# Patient Record
Sex: FEMALE | Race: ASIAN | NOT HISPANIC OR LATINO | Employment: STUDENT | URBAN - METROPOLITAN AREA
[De-identification: names, ages, dates, MRNs, and addresses within clinical notes are randomized per-mention and may not be internally consistent; named-entity substitution may affect disease eponyms.]

---
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
## 2019-02-07 DIAGNOSIS — Z30.41 ENCOUNTER FOR BIRTH CONTROL PILLS MAINTENANCE: ICD-10-CM

## 2019-02-07 RX ORDER — NORGESTIMATE AND ETHINYL ESTRADIOL 7DAYSX3 28
KIT ORAL
Qty: 84 TABLET | Refills: 0 | Status: SHIPPED | OUTPATIENT
Start: 2019-02-07 | End: 2019-02-07 | Stop reason: SDUPTHER

## 2019-02-07 RX ORDER — NORGESTIMATE AND ETHINYL ESTRADIOL 7DAYSX3 28
1 KIT ORAL DAILY
Qty: 84 TABLET | Refills: 0 | Status: SHIPPED | OUTPATIENT
Start: 2019-02-07 | End: 2019-02-11 | Stop reason: SDUPTHER

## 2019-02-11 DIAGNOSIS — Z30.41 ENCOUNTER FOR BIRTH CONTROL PILLS MAINTENANCE: ICD-10-CM

## 2019-02-11 RX ORDER — NORGESTIMATE AND ETHINYL ESTRADIOL 7DAYSX3 28
KIT ORAL
Qty: 84 TABLET | Refills: 0 | Status: SHIPPED | OUTPATIENT
Start: 2019-02-11 | End: 2019-08-05 | Stop reason: SDUPTHER

## 2019-03-06 ENCOUNTER — OFFICE VISIT (OUTPATIENT)
Dept: OBGYN CLINIC | Facility: CLINIC | Age: 19
End: 2019-03-06
Payer: COMMERCIAL

## 2019-03-06 VITALS
WEIGHT: 119 LBS | DIASTOLIC BLOOD PRESSURE: 60 MMHG | HEIGHT: 67 IN | BODY MASS INDEX: 18.68 KG/M2 | SYSTOLIC BLOOD PRESSURE: 98 MMHG

## 2019-03-06 DIAGNOSIS — N60.12 FIBROCYSTIC BREAST CHANGES, LEFT: ICD-10-CM

## 2019-03-06 DIAGNOSIS — N63.10 BREAST MASS, RIGHT: Primary | ICD-10-CM

## 2019-03-06 DIAGNOSIS — N83.209 OVARIAN CYST RUPTURE: ICD-10-CM

## 2019-03-06 PROCEDURE — 99214 OFFICE O/P EST MOD 30 MIN: CPT | Performed by: OBSTETRICS & GYNECOLOGY

## 2019-03-06 NOTE — PATIENT INSTRUCTIONS
Fibrocystic Breast Changes   WHAT YOU NEED TO KNOW:   Fibrocystic changes are changes in your breast tissue  Your breast tissue may have small cysts, benign lumps (not cancer), or thickened areas  These breast tissue changes are common in women who have not gone through menopause  Fibrocystic breast changes do not increase your risk of breast cancer  The cause of fibrocystic breast changes is unknown  They may be related to hormonal changes that occur during your menstrual cycle  DISCHARGE INSTRUCTIONS:   Contact your healthcare provider if:   · You notice other changes in your breasts or nipples, such as dimpling or a rash  · You have bloody nipple discharge  · You have a fever  · You have questions or concerns about your condition or care  Medicines: You may need any of the following:  · NSAIDs , such as ibuprofen, help decrease swelling, pain, and fever  This medicine is available with or without a doctor's order  NSAIDs can cause stomach bleeding or kidney problems in certain people  If you take blood thinner medicine, always ask your healthcare provider if NSAIDs are safe for you  Always read the medicine label and follow directions  · Oral contraceptives  (birth control pills) may be recommended by your healthcare provider  These may help to decrease the level of hormones that worsen your signs and symptoms  · Take your medicine as directed  Contact your healthcare provider if you think your medicine is not helping or if you have side effects  Tell him of her if you are allergic to any medicine  Keep a list of the medicines, vitamins, and herbs you take  Include the amounts, and when and why you take them  Bring the list or the pill bottles to follow-up visits  Carry your medicine list with you in case of an emergency  Manage your symptoms:   · Apply heat  on your breasts for 20 to 30 minutes every 2 hours for as many days as directed   Heat helps decrease pain and muscle spasms  · Apply ice  on your breasts for 15 to 20 minutes every hour or as directed  Use an ice pack, or put crushed ice in a plastic bag  Cover it with a towel  Ice helps prevent tissue damage and decreases swelling and pain  · Wear a well-fitted, supportive bra  It may help to relieve pain and swelling  · Avoid or limit caffeine  This may help to decrease symptoms in some women  Caffeine is found in coffee, tea, sodas, and chocolate  Continue breast self-exams:  Check your breast for changes in size, shape, or feel of the breast tissue  Check under your arms and all around your breasts  If you have monthly periods, examine your breasts after your period is over  Contact your healthcare provider if you notice any changes in your breasts  If you have questions, ask for more information about how to do a breast self-exam         Follow up with your healthcare provider as directed:  Write down your questions so you remember to ask them during your visits  © 2017 [facility] [facility] Information is for End User's use only and may not be sold, redistributed or otherwise used for commercial purposes  All illustrations and images included in CareNotes® are the copyrighted property of [employer] , [employer]  or Leonid Lynch  The above information is an  only  It is not intended as medical advice for individual conditions or treatments  Talk to your doctor, nurse or pharmacist before following any medical regimen to see if it is safe and effective for you

## 2019-03-06 NOTE — PROGRESS NOTES
Assessment/Plan:     Diagnoses and all orders for this visit:    Breast mass, right  -     US breast right limited (diagnostic); Future    Fibrocystic breast changes, left  -     US breast left limited (diagnostic); Future    Ovarian cyst rupture  -     US pelvis transabdominal only; Future        Patient will be returning to college on   We ordered a right sided breast ultrasound and a left-sided breast ultrasound  We will call patient with results  Possible fibrocystic breast changes  Patient given information re: fibrocystic breast disease  Patient counseled on findings, diagnosis and management including use of NSAIDs, avoidance of caffeine  We will also order a pelvic ultrasound transabdominally to rule out any ovarian cysts or masses  We will call patient with results again  Subjective   Patient ID: Jose Sender is a 20 y o  female  Patient is here for problem visit  Patient was seen at [facility] clinic due to complaints of a right lump on her breast   Patient states that she noted axillary swelling that has now disappeared  On exam, there was a 1 cm mobile soft finding on the right tail  She did not have an ultrasound performed at that time  Patient declines any nipple discharge  Patient also with complaints of possible ruptured ovarian cyst where she was taken to the emergency room after passing out  She declines any pelvic pain  She is currently not sexually active and has never been sexually active  OB History        0    Para   0    Term   0       0    AB   0    Living   0       SAB   0    TAB   0    Ectopic   0    Multiple   0    Live Births   0                   Review of Systems   Constitutional: Negative  HENT: Negative  Eyes: Negative  Respiratory: Negative  Cardiovascular: Negative  Gastrointestinal: Negative  Endocrine: Negative  Genitourinary:        As noted in HPI   Musculoskeletal: Negative  Skin: Negative  Allergic/Immunologic: Negative  Neurological: Negative  Hematological: Negative  Psychiatric/Behavioral: Negative  History reviewed  No pertinent past medical history      Past Surgical History:   Procedure Laterality Date    EYE SURGERY      MOUTH SURGERY         Social History     Socioeconomic History    Marital status: Single     Spouse name: Not on file    Number of children: Not on file    Years of education: Not on file    Highest education level: Not on file   Occupational History    Not on file   Social Needs    Financial resource strain: Not on file    Food insecurity:     Worry: Not on file     Inability: Not on file    Transportation needs:     Medical: Not on file     Non-medical: Not on file   Tobacco Use    Smoking status: Never Smoker    Smokeless tobacco: Never Used   Substance and Sexual Activity    Alcohol use: Yes     Comment: occ    Drug use: No    Sexual activity: Never     Comment: uses birth control   Lifestyle    Physical activity:     Days per week: Not on file     Minutes per session: Not on file    Stress: Not on file   Relationships    Social connections:     Talks on phone: Not on file     Gets together: Not on file     Attends Church service: Not on file     Active member of club or organization: Not on file     Attends meetings of clubs or organizations: Not on file     Relationship status: Not on file    Intimate partner violence:     Fear of current or ex partner: Not on file     Emotionally abused: Not on file     Physically abused: Not on file     Forced sexual activity: Not on file   Other Topics Concern    Not on file   Social History Narrative    No caffeine use       Allergies   Allergen Reactions    Amoxicillin Hives    Cefprozil Hives    Ceftibuten     Diphenhydramine Hives    Penicillins          Current Outpatient Medications:     TRI-SPRINTEC 0 18/0 215/0 25 MG-35 MCG per tablet, TAKE ONE TABLET BY MOUTH DAILY, Disp: 84 tablet, Rfl: 0      Vitals:    19 0915   BP: 98/60       Body mass index is 18 64 kg/m²  Weight (last 2 days)     Date/Time   Weight    19 0915   54 (119)              Physical Exam   Constitutional: She is oriented to person, place, and time  Vital signs are normal  She appears well-developed and well-nourished  No distress  Pulmonary/Chest: She exhibits no mass and no deformity  Right breast exhibits mass  Right breast exhibits no inverted nipple, no nipple discharge and no tenderness  Left breast exhibits inverted nipple  Left breast exhibits no mass, no nipple discharge and no tenderness  Fibrocystic left breast    Right breast shows indistinct possible mass on right upper quadrant       Abdominal: Soft  She exhibits no distension  There is no tenderness  There is no guarding  Neurological: She is alert and oriented to person, place, and time  Skin: Skin is warm and dry  Psychiatric: She has a normal mood and affect  Her behavior is normal          Menstrual History:  OB History        0    Para   0    Term   0       0    AB   0    Living   0       SAB   0    TAB   0    Ectopic   0    Multiple   0    Live Births   0                  Patient's last menstrual period was 2019 (exact date)  The following portions of the patient's history were reviewed and updated as appropriate: allergies, current medications, past family history, past medical history, past social history, past surgical history and problem list       Counseling / Coordination of Care  Total time spent today30 minutes  minutes  Greater than 50% of total time was spent with the patient and / or family counseling and / or coordination of care

## 2019-03-08 ENCOUNTER — TELEPHONE (OUTPATIENT)
Dept: OBGYN CLINIC | Facility: CLINIC | Age: 19
End: 2019-03-08

## 2019-03-08 ENCOUNTER — ULTRASOUND (OUTPATIENT)
Dept: OBGYN CLINIC | Facility: CLINIC | Age: 19
End: 2019-03-08
Payer: COMMERCIAL

## 2019-03-08 ENCOUNTER — HOSPITAL ENCOUNTER (OUTPATIENT)
Dept: ULTRASOUND IMAGING | Facility: CLINIC | Age: 19
Discharge: HOME | End: 2019-03-08
Payer: COMMERCIAL

## 2019-03-08 VITALS — HEIGHT: 67 IN | WEIGHT: 119 LBS | BODY MASS INDEX: 18.68 KG/M2

## 2019-03-08 DIAGNOSIS — R10.31 RLQ ABDOMINAL PAIN: Primary | ICD-10-CM

## 2019-03-08 DIAGNOSIS — N63.10 BREAST MASS, RIGHT: ICD-10-CM

## 2019-03-08 DIAGNOSIS — N60.12 FIBROCYSTIC BREAST CHANGES, LEFT: ICD-10-CM

## 2019-03-08 PROCEDURE — 76642 ULTRASOUND BREAST LIMITED: CPT

## 2019-03-08 PROCEDURE — 76856 US EXAM PELVIC COMPLETE: CPT | Performed by: OBSTETRICS & GYNECOLOGY

## 2019-03-08 NOTE — PROGRESS NOTES
AMB US Pelvic Non OB  Date/Time: 3/8/2019 11:08 AM  Performed by: Caterina Wasserman  Authorized by: Sam Mercedes MD     Procedure details:     Indications: non-obstetric abdominal pain      Technique:  US Pelvic, Non-OB with complete exam    Position: supine exam    Uterine findings:     Diameter (mm):  29    Length (mm):  75    Width (mm):  42    Endometrial stripe: identified      Endometrium thickness (mm):  4 9  Left ovary findings:     Left ovary:  Visualized    Diameter (mm):  18 9    Length (mm):  28 8    Width (mm):  18 9  Right ovary findings:     Right ovary:  Visualized    Diameter (mm):  20 1    Length (mm):  25 9    Width (mm):  17 8  Other findings:     Free pelvic fluid: not identified    Post-Procedure Details:     Impression:  Serial transabdominal images were obtained through the distended urinary bladder  Anteverted uterus and bilateral ovaries appear within normal limits  No free fluid  Tolerance: Tolerated well, no immediate complications    Complications: no complications    Additional Procedure Comments:      Transabdominal ultrasound only due to patient never being sexually active

## 2019-03-08 NOTE — TELEPHONE ENCOUNTER
Spoke with pt re: breast US findings as no masses  Advised repeat clinical breast exam in 3 months and continued breast awareness  Also called her to let her know her pelvic US is normal and did not show ovarian cysts

## 2019-05-14 ENCOUNTER — OFFICE VISIT (OUTPATIENT)
Dept: FAMILY MEDICINE CLINIC | Facility: CLINIC | Age: 19
End: 2019-05-14
Payer: COMMERCIAL

## 2019-05-14 ENCOUNTER — TELEPHONE (OUTPATIENT)
Dept: FAMILY MEDICINE CLINIC | Facility: CLINIC | Age: 19
End: 2019-05-14

## 2019-05-14 VITALS — BODY MASS INDEX: 18.52 KG/M2 | HEIGHT: 67 IN | WEIGHT: 118 LBS

## 2019-05-14 DIAGNOSIS — H69.81 DYSFUNCTION OF RIGHT EUSTACHIAN TUBE: ICD-10-CM

## 2019-05-14 DIAGNOSIS — Z76.89 ENCOUNTER TO ESTABLISH CARE WITH NEW DOCTOR: ICD-10-CM

## 2019-05-14 DIAGNOSIS — H69.81 EUSTACHIAN TUBE DYSFUNCTION, RIGHT: ICD-10-CM

## 2019-05-14 DIAGNOSIS — Z13.21 ENCOUNTER FOR VITAMIN DEFICIENCY SCREENING: ICD-10-CM

## 2019-05-14 DIAGNOSIS — Z13.29 SCREENING FOR THYROID DISORDER: ICD-10-CM

## 2019-05-14 DIAGNOSIS — Z00.00 ANNUAL PHYSICAL EXAM: ICD-10-CM

## 2019-05-14 DIAGNOSIS — H92.01 RIGHT EAR PAIN: ICD-10-CM

## 2019-05-14 DIAGNOSIS — Z13.220 SCREENING FOR LIPID DISORDERS: ICD-10-CM

## 2019-05-14 DIAGNOSIS — Z13.89 SCREENING FOR GENITOURINARY CONDITION: ICD-10-CM

## 2019-05-14 DIAGNOSIS — Z13.1 SCREENING FOR DIABETES MELLITUS: Primary | ICD-10-CM

## 2019-05-14 PROCEDURE — 99385 PREV VISIT NEW AGE 18-39: CPT | Performed by: INTERNAL MEDICINE

## 2019-08-01 ENCOUNTER — APPOINTMENT (OUTPATIENT)
Dept: LAB | Facility: HOSPITAL | Age: 19
End: 2019-08-01
Payer: COMMERCIAL

## 2019-08-01 ENCOUNTER — OFFICE VISIT (OUTPATIENT)
Dept: FAMILY MEDICINE CLINIC | Facility: CLINIC | Age: 19
End: 2019-08-01
Payer: COMMERCIAL

## 2019-08-01 VITALS
DIASTOLIC BLOOD PRESSURE: 70 MMHG | HEART RATE: 73 BPM | HEIGHT: 67 IN | BODY MASS INDEX: 19.15 KG/M2 | OXYGEN SATURATION: 98 % | SYSTOLIC BLOOD PRESSURE: 98 MMHG | WEIGHT: 122 LBS

## 2019-08-01 DIAGNOSIS — Z00.00 ANNUAL PHYSICAL EXAM: ICD-10-CM

## 2019-08-01 DIAGNOSIS — Z82.79 FAMILY HISTORY OF BICUSPID AORTIC VALVE: Primary | ICD-10-CM

## 2019-08-01 LAB
25(OH)D3 SERPL-MCNC: 20.7 NG/ML (ref 30–100)
ALBUMIN SERPL BCP-MCNC: 4.2 G/DL (ref 3–5.2)
ALP SERPL-CCNC: 50 U/L (ref 43–122)
ALT SERPL W P-5'-P-CCNC: 14 U/L (ref 9–52)
ANION GAP SERPL CALCULATED.3IONS-SCNC: 7 MMOL/L (ref 5–14)
AST SERPL W P-5'-P-CCNC: 28 U/L (ref 14–36)
BACTERIA UR QL AUTO: ABNORMAL /HPF
BASOPHILS # BLD AUTO: 0.1 THOUSANDS/ΜL (ref 0–0.1)
BASOPHILS NFR BLD AUTO: 1 % (ref 0–1)
BILIRUB SERPL-MCNC: 0.4 MG/DL
BILIRUB UR QL STRIP: NEGATIVE
BUN SERPL-MCNC: 8 MG/DL (ref 5–25)
CALCIUM SERPL-MCNC: 9.5 MG/DL (ref 8.4–10.2)
CHLORIDE SERPL-SCNC: 104 MMOL/L (ref 97–108)
CHOLEST SERPL-MCNC: 203 MG/DL
CLARITY UR: ABNORMAL
CO2 SERPL-SCNC: 28 MMOL/L (ref 22–30)
COLOR UR: YELLOW
CREAT SERPL-MCNC: 0.56 MG/DL (ref 0.6–1.2)
EOSINOPHIL # BLD AUTO: 0.1 THOUSAND/ΜL (ref 0–0.4)
EOSINOPHIL NFR BLD AUTO: 1 % (ref 0–6)
ERYTHROCYTE [DISTWIDTH] IN BLOOD BY AUTOMATED COUNT: 14.4 %
GFR SERPL CREATININE-BSD FRML MDRD: 135 ML/MIN/1.73SQ M
GLUCOSE P FAST SERPL-MCNC: 81 MG/DL (ref 70–99)
GLUCOSE UR STRIP-MCNC: NEGATIVE MG/DL
HCT VFR BLD AUTO: 38.1 % (ref 36–46)
HDLC SERPL-MCNC: 73 MG/DL (ref 40–59)
HGB BLD-MCNC: 12.3 G/DL (ref 12–16)
HGB UR QL STRIP.AUTO: NEGATIVE
KETONES UR STRIP-MCNC: NEGATIVE MG/DL
LDLC SERPL CALC-MCNC: 106 MG/DL
LEUKOCYTE ESTERASE UR QL STRIP: 25
LYMPHOCYTES # BLD AUTO: 1.9 THOUSANDS/ΜL (ref 0.5–4)
LYMPHOCYTES NFR BLD AUTO: 20 % (ref 25–45)
MCH RBC QN AUTO: 28.1 PG (ref 26–34)
MCHC RBC AUTO-ENTMCNC: 32.2 G/DL (ref 31–36)
MCV RBC AUTO: 87 FL (ref 80–100)
MONOCYTES # BLD AUTO: 0.6 THOUSAND/ΜL (ref 0.2–0.9)
MONOCYTES NFR BLD AUTO: 6 % (ref 1–10)
NEUTROPHILS # BLD AUTO: 6.7 THOUSANDS/ΜL (ref 1.8–7.8)
NEUTS SEG NFR BLD AUTO: 72 % (ref 45–65)
NITRITE UR QL STRIP: NEGATIVE
NON-SQ EPI CELLS URNS QL MICRO: ABNORMAL /HPF
NONHDLC SERPL-MCNC: 130 MG/DL
PH UR STRIP.AUTO: 7 [PH]
PLATELET # BLD AUTO: 318 THOUSANDS/UL (ref 150–450)
PMV BLD AUTO: 9.7 FL (ref 8.9–12.7)
POTASSIUM SERPL-SCNC: 4.2 MMOL/L (ref 3.6–5)
PROT SERPL-MCNC: 7.6 G/DL (ref 5.9–8.4)
PROT UR STRIP-MCNC: NEGATIVE MG/DL
RBC # BLD AUTO: 4.36 MILLION/UL (ref 4–5.2)
RBC #/AREA URNS AUTO: ABNORMAL /HPF
SODIUM SERPL-SCNC: 139 MMOL/L (ref 137–147)
SP GR UR STRIP.AUTO: 1.01 (ref 1–1.04)
TRIGL SERPL-MCNC: 119 MG/DL
TSH SERPL DL<=0.05 MIU/L-ACNC: 1.04 UIU/ML (ref 0.47–4.68)
UROBILINOGEN UA: NEGATIVE MG/DL
WBC # BLD AUTO: 9.3 THOUSAND/UL (ref 4.5–11)
WBC #/AREA URNS AUTO: ABNORMAL /HPF

## 2019-08-01 PROCEDURE — 81001 URINALYSIS AUTO W/SCOPE: CPT | Performed by: INTERNAL MEDICINE

## 2019-08-01 PROCEDURE — 99395 PREV VISIT EST AGE 18-39: CPT | Performed by: INTERNAL MEDICINE

## 2019-08-01 PROCEDURE — 80061 LIPID PANEL: CPT | Performed by: INTERNAL MEDICINE

## 2019-08-01 PROCEDURE — 36415 COLL VENOUS BLD VENIPUNCTURE: CPT | Performed by: INTERNAL MEDICINE

## 2019-08-01 PROCEDURE — 85025 COMPLETE CBC W/AUTO DIFF WBC: CPT | Performed by: INTERNAL MEDICINE

## 2019-08-01 PROCEDURE — 82306 VITAMIN D 25 HYDROXY: CPT | Performed by: INTERNAL MEDICINE

## 2019-08-01 PROCEDURE — 80053 COMPREHEN METABOLIC PANEL: CPT | Performed by: INTERNAL MEDICINE

## 2019-08-01 PROCEDURE — 84443 ASSAY THYROID STIM HORMONE: CPT | Performed by: INTERNAL MEDICINE

## 2019-08-01 NOTE — PROGRESS NOTES
Assessment/Plan:         Diagnoses and all orders for this visit:  Annual physical exam  Patient will go for lab studies ordered last visit  She will also get echocardiogram as mentioned below  I will see her back in a year  Patient encouraged to get flu vaccine in October  Family history of bicuspid aortic valve  -     Echo complete with contrast if indicated; Future            Subjective:      Patient ID: Dustin Valencia is a 20 y o  female  HPI  Patient is here for annual physical   Had seen a couple of months ago with right ear discomfort reports that is completely resolved  Patient's father has bicuspid aortic valve  I will order screening echocardiogram   Patient returns to [address] [place] [address] in 3 weeks  The following portions of the patient's history were reviewed and updated as appropriate: allergies, current medications, past family history, past medical history, past social history, past surgical history and problem list       Review of Systems   Constitutional: Negative for appetite change, chills, fatigue, fever and unexpected weight change  HENT: Negative for congestion, hearing loss, postnasal drip, trouble swallowing and voice change  Eyes: Negative for pain and visual disturbance  Respiratory: Negative for cough, chest tightness and shortness of breath  Cardiovascular: Negative for chest pain, palpitations and leg swelling  Gastrointestinal: Negative for abdominal pain, blood in stool, constipation, diarrhea, nausea and vomiting  Endocrine: Negative for cold intolerance, heat intolerance, polydipsia and polyphagia  Genitourinary: Negative for difficulty urinating, flank pain, frequency and hematuria  Musculoskeletal: Negative for arthralgias, back pain, gait problem, joint swelling and myalgias  Skin: Negative for rash  Neurological: Negative for dizziness, weakness, light-headedness, numbness and headaches  Hematological: Negative for adenopathy  Does not bruise/bleed easily  Psychiatric/Behavioral: Negative for confusion, dysphoric mood and sleep disturbance  Objective:      BP 98/70 (BP Location: Left arm, Patient Position: Sitting, Cuff Size: Standard)   Pulse 73   Ht 5' 7" (1 702 m)   Wt 55 3 kg (122 lb)   SpO2 98%   BMI 19 11 kg/m²          Physical Exam   Constitutional: She is oriented to person, place, and time  She appears well-developed and well-nourished  No distress  HENT:   Head: Normocephalic  Mouth/Throat: No oropharyngeal exudate  Eyes: Pupils are equal, round, and reactive to light  No scleral icterus  Neck: No thyromegaly present  Cardiovascular: Normal rate, regular rhythm, normal heart sounds and intact distal pulses  No murmur heard  Pulmonary/Chest: Effort normal and breath sounds normal  No respiratory distress  She has no wheezes  She has no rales  Abdominal: Soft  Bowel sounds are normal  There is no tenderness  There is no rebound  Musculoskeletal: She exhibits no edema  Lymphadenopathy:     She has no cervical adenopathy  Neurological: She is alert and oriented to person, place, and time  She has normal reflexes  No cranial nerve deficit  Skin: Skin is warm  Psychiatric: She has a normal mood and affect   Her behavior is normal  Judgment and thought content normal

## 2019-08-05 DIAGNOSIS — Z30.41 ENCOUNTER FOR BIRTH CONTROL PILLS MAINTENANCE: ICD-10-CM

## 2019-08-05 RX ORDER — NORGESTIMATE AND ETHINYL ESTRADIOL 7DAYSX3 28
KIT ORAL
Qty: 84 TABLET | Refills: 0 | Status: SHIPPED | OUTPATIENT
Start: 2019-08-05 | End: 2019-08-06 | Stop reason: SDUPTHER

## 2019-08-06 ENCOUNTER — ANNUAL EXAM (OUTPATIENT)
Dept: OBGYN CLINIC | Facility: CLINIC | Age: 19
End: 2019-08-06
Payer: COMMERCIAL

## 2019-08-06 VITALS
BODY MASS INDEX: 18.93 KG/M2 | WEIGHT: 120.6 LBS | DIASTOLIC BLOOD PRESSURE: 64 MMHG | HEIGHT: 67 IN | SYSTOLIC BLOOD PRESSURE: 108 MMHG

## 2019-08-06 DIAGNOSIS — Z23 NEED FOR HPV VACCINATION: ICD-10-CM

## 2019-08-06 DIAGNOSIS — Z71.85 HPV VACCINE COUNSELING: ICD-10-CM

## 2019-08-06 DIAGNOSIS — Z01.419 ENCOUNTER FOR GYNECOLOGICAL EXAMINATION (GENERAL) (ROUTINE) WITHOUT ABNORMAL FINDINGS: Primary | ICD-10-CM

## 2019-08-06 DIAGNOSIS — Z11.51 ENCOUNTER FOR SCREENING FOR HUMAN PAPILLOMAVIRUS (HPV): ICD-10-CM

## 2019-08-06 DIAGNOSIS — Z30.41 ENCOUNTER FOR BIRTH CONTROL PILLS MAINTENANCE: ICD-10-CM

## 2019-08-06 PROCEDURE — 90651 9VHPV VACCINE 2/3 DOSE IM: CPT

## 2019-08-06 PROCEDURE — 90471 IMMUNIZATION ADMIN: CPT

## 2019-08-06 PROCEDURE — 99395 PREV VISIT EST AGE 18-39: CPT | Performed by: OBSTETRICS & GYNECOLOGY

## 2019-08-06 RX ORDER — NORGESTIMATE AND ETHINYL ESTRADIOL 7DAYSX3 28
1 KIT ORAL DAILY
Qty: 84 TABLET | Refills: 3 | Status: SHIPPED | OUTPATIENT
Start: 2019-08-06 | End: 2020-09-21

## 2019-08-06 NOTE — PROGRESS NOTES
Assessment        Diagnoses and all orders for this visit:    Encounter for gynecological examination (general) (routine) without abnormal findings    Encounter for birth control pills maintenance  -     norgestimate-ethinyl estradiol (TRI-SPRINTEC) 0 18/0 215/0 25 MG-35 MCG per tablet; Take 1 tablet by mouth daily    Encounter for screening for human papillomavirus (HPV)    HPV vaccine counseling  -     HPV Vaccine 9 valent IM    Need for HPV vaccination  -     HPV Vaccine 9 valent IM                  Plan      All questions answered  Contraception: OCP (estrogen/progesterone)  Follow up in 2 months  Follow up as needed  Patient counseled on HPV vaccine  She received this today  Follow-up in 2 months for HPV vaccine #2    Syed Cordero is a 20 y o  female who presents for annual exam     Patient is here requesting OCP refill  She is not yet and has never been sexually active  Patient initially was seen by plastic surgery for possible labia plasty  This is on hold due to school scheduling conflicts  Patient has never had HPV vaccine and is interested in that BV vaccination  Current contraception: OCP (estrogen/progesterone)  History of abnormal Pap smear: no  History of abnormal mammogram: no  Family history of uterine or ovarian cancer: no  Family history of breast cancer: no    Menstrual History:  OB History        0    Para   0    Term   0       0    AB   0    Living   0       SAB   0    TAB   0    Ectopic   0    Multiple   0    Live Births   0                  Patient's last menstrual period was 2019 (exact date)           Past Medical History:   Diagnosis Date    Fibrocystic breast      Past Surgical History:   Procedure Laterality Date    EYE SURGERY      MOUTH SURGERY       Social History     Socioeconomic History    Marital status: Single     Spouse name: Not on file    Number of children: Not on file    Years of education: Not on file    Highest education level: Not on file   Occupational History    Not on file   Social Needs    Financial resource strain: Not on file    Food insecurity:     Worry: Not on file     Inability: Not on file    Transportation needs:     Medical: Not on file     Non-medical: Not on file   Tobacco Use    Smoking status: Never Smoker    Smokeless tobacco: Never Used   Substance and Sexual Activity    Alcohol use: Yes     Comment: occ    Drug use: No    Sexual activity: Never     Comment: uses birth control   Lifestyle    Physical activity:     Days per week: Not on file     Minutes per session: Not on file    Stress: Not on file   Relationships    Social connections:     Talks on phone: Not on file     Gets together: Not on file     Attends Anabaptist service: Not on file     Active member of club or organization: Not on file     Attends meetings of clubs or organizations: Not on file     Relationship status: Not on file    Intimate partner violence:     Fear of current or ex partner: Not on file     Emotionally abused: Not on file     Physically abused: Not on file     Forced sexual activity: Not on file   Other Topics Concern    Not on file   Social History Narrative    No caffeine use         Current Outpatient Medications:     norgestimate-ethinyl estradiol (TRI-SPRINTEC) 0 18/0 215/0 25 MG-35 MCG per tablet, Take 1 tablet by mouth daily, Disp: 84 tablet, Rfl: 3    Allergies   Allergen Reactions    Amoxicillin Hives    Cefprozil Hives    Ceftibuten     Diphenhydramine Hives    Penicillins            Review of Systems   Constitutional: Negative  HENT: Negative  Eyes: Negative  Respiratory: Negative  Cardiovascular: Negative  Gastrointestinal: Negative  Endocrine: Negative  Genitourinary:        As noted in HPI   Musculoskeletal: Negative  Skin: Negative  Allergic/Immunologic: Negative  Neurological: Negative  Hematological: Negative  Psychiatric/Behavioral: Negative  /64 (BP Location: Right arm, Patient Position: Sitting, Cuff Size: Standard)   Ht 5' 7" (1 702 m)   Wt 54 7 kg (120 lb 9 6 oz)   LMP 07/17/2019 (Exact Date)   BMI 18 89 kg/m²         Physical Exam   Constitutional: She is oriented to person, place, and time  She appears well-developed and well-nourished  Genitourinary: Pelvic exam was performed with patient supine  There is no rash or lesion on the right labia  There is no rash or lesion on the left labia  Genitourinary Comments: Deferred internal examination       HENT:   Head: Normocephalic  Neck: No thyromegaly present  Cardiovascular: Normal rate, regular rhythm and normal heart sounds  No murmur heard  Pulmonary/Chest: Effort normal and breath sounds normal  No respiratory distress  She has no wheezes  She has no rales  Right breast exhibits no mass, no nipple discharge, no skin change and no tenderness  Left breast exhibits no mass, no nipple discharge, no skin change and no tenderness  Abdominal: Soft  She exhibits no distension and no mass  There is no tenderness  There is no rebound and no guarding  Musculoskeletal: She exhibits no edema or tenderness  Lymphadenopathy:        Right: No inguinal adenopathy present  Left: No inguinal adenopathy present  Neurological: She is alert and oriented to person, place, and time  Skin: Skin is warm and dry  Psychiatric: She has a normal mood and affect

## 2019-08-06 NOTE — PATIENT INSTRUCTIONS
HPV (Human Papillomavirus) Vaccine for Adults   WHAT YOU NEED TO KNOW:   The HPV vaccine is an injection given to females and males to protect against human papillomavirus infection  HPV is most commonly spread through sexual activity  It can also be spread from a mother to her baby during delivery  The HPV vaccine is most effective if given before sexual activity begins  This allows your body to build almost complete protection against HPV before you have contact with the virus  The HPV vaccine is still effective through the age of 32 after sexual activity has begun  DISCHARGE INSTRUCTIONS:   Call 911 for any of the following:   · You have signs of a severe allergic reaction, such as trouble breathing, hives, or wheezing  Return to the emergency department if:   · You have a high fever or behavior changes that concern you  Contact your healthcare provider if:   · You have questions or concerns about the HPV vaccine  Apply a warm compress  to the area to relieve swelling and pain  Follow up with your healthcare provider as directed:  Write down your questions so you remember to ask them during your visits  © 2017 [address] is for End User's use only and may not be sold, redistributed or otherwise used for commercial purposes  All illustrations and images included in CareNotes® are the copyrighted property of A D A M [employer] Inc  or Leonid Lynch  The above information is an  only  It is not intended as medical advice for individual conditions or treatments  Talk to your doctor, nurse or pharmacist before following any medical regimen to see if it is safe and effective for you

## 2019-08-07 ENCOUNTER — HOSPITAL ENCOUNTER (OUTPATIENT)
Dept: NON INVASIVE DIAGNOSTICS | Facility: CLINIC | Age: 19
Discharge: HOME | End: 2019-08-07
Payer: COMMERCIAL

## 2019-08-07 DIAGNOSIS — Z82.79 FAMILY HISTORY OF BICUSPID AORTIC VALVE: ICD-10-CM

## 2019-08-07 PROCEDURE — 93306 TTE W/DOPPLER COMPLETE: CPT

## 2019-08-07 PROCEDURE — 93306 TTE W/DOPPLER COMPLETE: CPT | Performed by: INTERNAL MEDICINE

## 2019-11-24 ENCOUNTER — OFFICE VISIT (OUTPATIENT)
Dept: URGENT CARE | Facility: MEDICAL CENTER | Age: 19
End: 2019-11-24
Payer: COMMERCIAL

## 2019-11-24 VITALS
RESPIRATION RATE: 16 BRPM | HEART RATE: 109 BPM | WEIGHT: 125.66 LBS | DIASTOLIC BLOOD PRESSURE: 69 MMHG | TEMPERATURE: 97.9 F | HEIGHT: 67 IN | OXYGEN SATURATION: 100 % | SYSTOLIC BLOOD PRESSURE: 105 MMHG | BODY MASS INDEX: 19.72 KG/M2

## 2019-11-24 DIAGNOSIS — J02.9 PHARYNGITIS, UNSPECIFIED ETIOLOGY: Primary | ICD-10-CM

## 2019-11-24 LAB — S PYO AG THROAT QL: NEGATIVE

## 2019-11-24 PROCEDURE — 99213 OFFICE O/P EST LOW 20 MIN: CPT | Performed by: PHYSICIAN ASSISTANT

## 2019-11-24 PROCEDURE — 87070 CULTURE OTHR SPECIMN AEROBIC: CPT | Performed by: PHYSICIAN ASSISTANT

## 2019-11-24 PROCEDURE — 87880 STREP A ASSAY W/OPTIC: CPT | Performed by: PHYSICIAN ASSISTANT

## 2019-11-24 PROCEDURE — S9088 SERVICES PROVIDED IN URGENT: HCPCS | Performed by: PHYSICIAN ASSISTANT

## 2019-11-24 RX ORDER — AZITHROMYCIN 250 MG/1
TABLET, FILM COATED ORAL
Qty: 6 TABLET | Refills: 0 | Status: SHIPPED | COMMUNITY
Start: 2019-11-24 | End: 2019-11-29

## 2019-11-24 NOTE — PATIENT INSTRUCTIONS
Take antibiotic as directed until completed  Flonase and zyrtec as needed for congestion  Motrin Tylenol as needed  Drink plenty of fluids and stay well hydrated  Follow up with PCP in 3-5 days  Proceed to  ER if symptoms worsen  Pharyngitis   AMBULATORY CARE:   Pharyngitis , or sore throat, is inflammation of the tissues and structures in your pharynx (throat)  Pharyngitis is most often caused by bacteria  It may also be caused by a cold or flu virus  Other causes include smoking, allergies, or acid reflux  Signs and symptoms that may occur with pharyngitis:   · Sore throat or pain when you swallow    · Fever, chills, and body aches    · Hoarse or raspy voice    · Cough, runny or stuffy nose, itchy or watery eyes    · Headache    · Upset stomach and loss of appetite    · Mild neck stiffness    · Swollen glands that feel like hard lumps when you touch your neck    · White and yellow pus-filled blisters in the back of your throat  Call 911 for any of the following:   · You have trouble breathing or swallowing because your throat is swollen or sore  Seek care immediately if:   · You are drooling because it hurts too much to swallow  · Your fever is higher than 102? F (39?C) or lasts longer than 3 days  · You are confused  · You taste blood in your throat  Contact your healthcare provider if:   · Your throat pain gets worse  · You have a painful lump in your throat that does not go away after 5 days  · Your symptoms do not improve after 5 days  · You have questions or concerns about your condition or care  Treatment for pharyngitis:  Viral pharyngitis will go away on its own without treatment  Your sore throat should start to feel better in 3 to 5 days for both viral and bacterial infections  You may need any of the following:  · Antibiotics  treat a bacterial infection  · NSAIDs , such as ibuprofen, help decrease swelling, pain, and fever   NSAIDs can cause stomach bleeding or kidney problems in certain people  If you take blood thinner medicine, always ask your healthcare provider if NSAIDs are safe for you  Always read the medicine label and follow directions  · Acetaminophen  decreases pain and fever  It is available without a doctor's order  Ask how much to take and how often to take it  Follow directions  Acetaminophen can cause liver damage if not taken correctly  Manage your symptoms:   · Gargle salt water  Mix ¼ teaspoon salt in an 8 ounce glass of warm water and gargle  This may help decrease swelling in your throat  · Drink liquids as directed  You may need to drink more liquids than usual  Liquids may help soothe your throat and prevent dehydration  Ask how much liquid to drink each day and which liquids are best for you  · Use a cool-steam humidifier  to help moisten the air in your room and calm your cough  · Soothe your throat  with cough drops, ice, soft foods, or popsicles  Prevent the spread of pharyngitis:  Cover your mouth and nose when you cough or sneeze  Do not share food or drinks  Wash your hands often  Use soap and water  If soap and water are unavailable, use an alcohol based hand   Follow up with your healthcare provider as directed:  Write down your questions so you remember to ask them during your visits  © 2017 [address] Information is for End User's use only and may not be sold, redistributed or otherwise used for commercial purposes  All illustrations and images included in CareNotes® are the copyrighted property of A [employer] M [employer] Inc  or Leonid Lynch  The above information is an  only  It is not intended as medical advice for individual conditions or treatments  Talk to your doctor, nurse or pharmacist before following any medical regimen to see if it is safe and effective for you

## 2019-11-24 NOTE — PROGRESS NOTES
[facility]        NAME: Dallas Duverney is a 20 y o  female  : 2000    MRN: [id number]  DATE: 2019  TIME: 2:46 PM    Assessment and Plan   Pharyngitis, unspecified etiology [J02 9]  1  Pharyngitis, unspecified etiology  POCT rapid strepA    Throat culture    azithromycin (ZITHROMAX) 250 mg tablet         Patient Instructions     Take antibiotic as directed until completed  Flonase and zyrtec as needed for congestion  Motrin Tylenol as needed  Drink plenty of fluids and stay well hydrated  Follow up with PCP in 3-5 days  Proceed to  ER if symptoms worsen  Chief Complaint     Chief Complaint   Patient presents with    Fever     c/o nausea and low grade fever approx 2-3 days, also with sore throat and nasal congestion         History of Present Illness       79-year-old female presents with sore throat runny nose congestion very mild dry cough upset stomach with couple bouts of vomiting a couple days ago  Has been having some intermittent low-grade fevers for the past 2-3 days  Some nasal congestion  Has been taking some Motrin Tylenol for fever reduction  Denies any chest pain shortness of breath  Denies any diarrhea  Sore Throat    This is a new problem  The current episode started in the past 7 days  The problem has been gradually worsening  Neither side of throat is experiencing more pain than the other  Maximum temperature: Low-grade  The pain is moderate  Associated symptoms include congestion, coughing (Dry minimal) and vomiting  Pertinent negatives include no abdominal pain, drooling, ear discharge, ear pain, hoarse voice, shortness of breath or trouble swallowing  She has had no exposure to strep or mono  She has tried nothing for the symptoms  The treatment provided no relief  Review of Systems   Review of Systems   Constitutional: Positive for chills, fatigue and fever  HENT: Positive for congestion, rhinorrhea and sore throat   Negative for drooling, ear discharge, ear pain, hoarse voice and trouble swallowing  Eyes: Negative  Respiratory: Positive for cough (Dry minimal)  Negative for shortness of breath  Cardiovascular: Negative  Gastrointestinal: Positive for nausea and vomiting  Negative for abdominal pain  Musculoskeletal: Negative  Skin: Negative  Neurological: Negative  Current Medications       Current Outpatient Medications:     norgestimate-ethinyl estradiol (TRI-SPRINTEC) 0 18/0 215/0 25 MG-35 MCG per tablet, Take 1 tablet by mouth daily, Disp: 84 tablet, Rfl: 3    azithromycin (ZITHROMAX) 250 mg tablet, Take 2 tablets today then 1 tablet daily x 4 days, Disp: 6 tablet, Rfl: 0    Current Allergies     Allergies as of 11/24/2019 - Reviewed 11/24/2019   Allergen Reaction Noted    Amoxicillin Hives 07/15/2015    Cefprozil Hives 07/15/2015    Ceftibuten  07/15/2015    Diphenhydramine Hives 07/15/2015    Penicillins  07/17/2017            The following portions of the patient's history were reviewed and updated as appropriate: allergies, current medications, past family history, past medical history, past social history, past surgical history and problem list      Past Medical History:   Diagnosis Date    Fibrocystic breast        Past Surgical History:   Procedure Laterality Date    EYE SURGERY      MOUTH SURGERY         Family History   Problem Relation Age of Onset    No Known Problems Mother     Hyperlipidemia Father          Medications have been verified  Objective   /69   Pulse (!) 109   Temp 97 9 °F (36 6 °C) (Tympanic)   Resp 16   Ht 5' 7" (1 702 m)   Wt 57 kg (125 lb 10 6 oz)   SpO2 100%   BMI 19 68 kg/m²        Physical Exam     Physical Exam   Constitutional: She is oriented to person, place, and time  She appears well-developed and well-nourished  No distress  HENT:   Head: Normocephalic and atraumatic     Right Ear: Hearing, tympanic membrane, external ear and ear canal normal    Left Ear: Hearing, tympanic membrane, external ear and ear canal normal    Nose: Nose normal    Mouth/Throat: Uvula is midline and mucous membranes are normal  Oropharyngeal exudate and posterior oropharyngeal erythema present  Eyes: Conjunctivae and EOM are normal  Right eye exhibits no discharge  Left eye exhibits no discharge  Neck: Normal range of motion  Neck supple  Cardiovascular: Normal rate, regular rhythm, normal heart sounds and intact distal pulses  No murmur heard  Pulmonary/Chest: Effort normal and breath sounds normal  No respiratory distress  She has no wheezes  She has no rales  Abdominal: Soft  Bowel sounds are normal  There is no tenderness  Musculoskeletal: Normal range of motion  Lymphadenopathy:     She has cervical adenopathy (Moderate)  Neurological: She is alert and oriented to person, place, and time  Skin: Skin is warm and dry  Psychiatric: She has a normal mood and affect  Nursing note and vitals reviewed

## 2019-11-26 LAB — BACTERIA THROAT CULT: NORMAL

## 2019-11-27 ENCOUNTER — APPOINTMENT (OUTPATIENT)
Dept: LAB | Age: 19
End: 2019-11-27
Payer: COMMERCIAL

## 2019-11-27 ENCOUNTER — OFFICE VISIT (OUTPATIENT)
Dept: FAMILY MEDICINE CLINIC | Facility: CLINIC | Age: 19
End: 2019-11-27
Payer: COMMERCIAL

## 2019-11-27 VITALS
TEMPERATURE: 101.8 F | OXYGEN SATURATION: 97 % | HEART RATE: 119 BPM | SYSTOLIC BLOOD PRESSURE: 104 MMHG | DIASTOLIC BLOOD PRESSURE: 80 MMHG

## 2019-11-27 DIAGNOSIS — J02.9 PHARYNGITIS, UNSPECIFIED ETIOLOGY: Primary | ICD-10-CM

## 2019-11-27 LAB
ALBUMIN SERPL BCP-MCNC: 3.4 G/DL (ref 3.5–5)
ALP SERPL-CCNC: 382 U/L (ref 46–116)
ALT SERPL W P-5'-P-CCNC: 104 U/L (ref 12–78)
AMYLASE SERPL-CCNC: 40 IU/L (ref 25–115)
ANION GAP SERPL CALCULATED.3IONS-SCNC: 8 MMOL/L (ref 4–13)
AST SERPL W P-5'-P-CCNC: 115 U/L (ref 5–45)
BILIRUB SERPL-MCNC: 1.79 MG/DL (ref 0.2–1)
BUN SERPL-MCNC: 4 MG/DL (ref 5–25)
CALCIUM SERPL-MCNC: 9.2 MG/DL (ref 8.3–10.1)
CHLORIDE SERPL-SCNC: 101 MMOL/L (ref 100–108)
CO2 SERPL-SCNC: 27 MMOL/L (ref 21–32)
CREAT SERPL-MCNC: 0.68 MG/DL (ref 0.6–1.3)
FLUAV RNA NPH QL NAA+PROBE: NORMAL
FLUBV RNA NPH QL NAA+PROBE: NORMAL
GFR SERPL CREATININE-BSD FRML MDRD: 126 ML/MIN/1.73SQ M
GLUCOSE SERPL-MCNC: 85 MG/DL (ref 65–140)
LIPASE SERPL-CCNC: 120 U/L (ref 73–393)
POTASSIUM SERPL-SCNC: 4 MMOL/L (ref 3.5–5.3)
PROT SERPL-MCNC: 8 G/DL (ref 6.4–8.2)
RSV RNA NPH QL NAA+PROBE: NORMAL
SODIUM SERPL-SCNC: 136 MMOL/L (ref 136–145)

## 2019-11-27 PROCEDURE — 85025 COMPLETE CBC W/AUTO DIFF WBC: CPT | Performed by: INTERNAL MEDICINE

## 2019-11-27 PROCEDURE — 85007 BL SMEAR W/DIFF WBC COUNT: CPT | Performed by: INTERNAL MEDICINE

## 2019-11-27 PROCEDURE — 82150 ASSAY OF AMYLASE: CPT

## 2019-11-27 PROCEDURE — 87070 CULTURE OTHR SPECIMN AEROBIC: CPT | Performed by: INTERNAL MEDICINE

## 2019-11-27 PROCEDURE — 85027 COMPLETE CBC AUTOMATED: CPT | Performed by: INTERNAL MEDICINE

## 2019-11-27 PROCEDURE — 99213 OFFICE O/P EST LOW 20 MIN: CPT | Performed by: INTERNAL MEDICINE

## 2019-11-27 PROCEDURE — 83690 ASSAY OF LIPASE: CPT

## 2019-11-27 PROCEDURE — 36415 COLL VENOUS BLD VENIPUNCTURE: CPT | Performed by: INTERNAL MEDICINE

## 2019-11-27 PROCEDURE — 80053 COMPREHEN METABOLIC PANEL: CPT | Performed by: INTERNAL MEDICINE

## 2019-11-27 PROCEDURE — 87631 RESP VIRUS 3-5 TARGETS: CPT | Performed by: INTERNAL MEDICINE

## 2019-11-27 NOTE — PROGRESS NOTES
Assessment/Plan:         Diagnoses and all orders for this visit:    Pharyngitis, unspecified etiology  -     CBC and differential  -     Comprehensive metabolic panel  -     MISCELLANEOUS LAB TEST; Future  -     MISCELLANEOUS LAB TEST; Future  -     Throat culture; Future  -     MISCELLANEOUS LAB TEST  -     MISCELLANEOUS LAB TEST  -     Throat culture  -     Mononucleosis screen; Future  -     Influenza A/B and RSV PCR; Future  -     Influenza A/B and RSV PCR    viral versus bacterial pharyngitis  Above lab studies ordered state  Close follow-up  Subjective:      Patient ID: Felix Coy is a 20 y o  female  Patient was in Urgent Care about a week ago with symptoms or URI  Rapid strep and culture was negative  She was given Zithromax symptoms have not improved  Patient continues to have increased sore throat exudate high-grade temperature nausea abdominal pain fever  She just got done with her periods  Denies any oral sex  Patient is [employer]  Previously healthy kid with no history of her risk activity  Sore Throat    This is a new problem  The current episode started in the past 7 days  The problem has been gradually worsening  The maximum temperature recorded prior to her arrival was 101 - 101 9 F  The pain is severe  Associated symptoms include abdominal pain, diarrhea and trouble swallowing  Pertinent negatives include no coughing or shortness of breath  Fever   Associated symptoms include abdominal pain, chills, a fever, myalgias, nausea and a sore throat  Pertinent negatives include no coughing  Abdominal Pain   Associated symptoms include diarrhea, a fever, myalgias and nausea  Nausea   Associated symptoms include abdominal pain, chills, a fever, myalgias, nausea and a sore throat  Pertinent negatives include no coughing         The following portions of the patient's history were reviewed and updated as appropriate: allergies, current medications, past family history, past medical history, past social history, past surgical history and problem list     Review of Systems   Constitutional: Positive for chills and fever  HENT: Positive for sore throat and trouble swallowing  Respiratory: Negative for cough and shortness of breath  Gastrointestinal: Positive for abdominal pain, diarrhea and nausea  Musculoskeletal: Positive for myalgias  Objective:      /80 (BP Location: Left arm, Patient Position: Sitting, Cuff Size: Standard)   Pulse (!) 119   Temp (!) 101 8 °F (38 8 °C)   SpO2 97%          Physical Exam   Constitutional: She is oriented to person, place, and time  She appears ill  HENT:   Mouth/Throat: Uvula is midline  Tonsillar exudate  Abdominal: Soft  She exhibits no mass  There is tenderness (Suprapubic tenderness negative Adrian or McBurney negative splenomegaly)  Lymphadenopathy:     She has cervical adenopathy (giant enlarged lymph nodes bilateral)  Neurological: She is alert and oriented to person, place, and time

## 2019-11-29 LAB
BACTERIA THROAT CULT: NORMAL
BASOPHILS # BLD MANUAL: 0 THOUSAND/UL (ref 0–0.1)
BASOPHILS NFR MAR MANUAL: 0 % (ref 0–1)
EOSINOPHIL # BLD MANUAL: 0 THOUSAND/UL (ref 0–0.4)
EOSINOPHIL NFR BLD MANUAL: 0 % (ref 0–6)
ERYTHROCYTE [DISTWIDTH] IN BLOOD BY AUTOMATED COUNT: 14.8 % (ref 11.6–15.1)
HCT VFR BLD AUTO: 44.2 % (ref 34.8–46.1)
HGB BLD-MCNC: 13.6 G/DL (ref 11.5–15.4)
LYMPHOCYTES # BLD AUTO: 55 % (ref 14–44)
LYMPHOCYTES # BLD AUTO: 6.39 THOUSAND/UL (ref 0.6–4.47)
MCH RBC QN AUTO: 27.1 PG (ref 26.8–34.3)
MCHC RBC AUTO-ENTMCNC: 30.8 G/DL (ref 31.4–37.4)
MCV RBC AUTO: 88 FL (ref 82–98)
MONOCYTES # BLD AUTO: 1.51 THOUSAND/UL (ref 0–1.22)
MONOCYTES NFR BLD: 13 % (ref 4–12)
NEUTROPHILS # BLD MANUAL: 2.67 THOUSAND/UL (ref 1.85–7.62)
NEUTS BAND NFR BLD MANUAL: 2 % (ref 0–8)
NEUTS SEG NFR BLD AUTO: 21 % (ref 43–75)
NRBC BLD AUTO-RTO: 0 /100 WBCS
PATHOLOGY REVIEW: YES
PLATELET # BLD AUTO: 228 THOUSANDS/UL (ref 149–390)
PLATELET BLD QL SMEAR: ADEQUATE
PMV BLD AUTO: 10.9 FL (ref 8.9–12.7)
RBC # BLD AUTO: 5.02 MILLION/UL (ref 3.81–5.12)
TOTAL CELLS COUNTED SPEC: 100
VARIANT LYMPHS # BLD AUTO: 9 %
WBC # BLD AUTO: 11.61 THOUSAND/UL (ref 4.31–10.16)

## 2019-12-03 DIAGNOSIS — D72.820 LYMPHOCYTOSIS: Primary | ICD-10-CM

## 2019-12-03 LAB — MISCELLANEOUS LAB TEST RESULT: NORMAL

## 2019-12-04 ENCOUNTER — TELEPHONE (OUTPATIENT)
Dept: FAMILY MEDICINE CLINIC | Facility: CLINIC | Age: 19
End: 2019-12-04

## 2019-12-04 NOTE — TELEPHONE ENCOUNTER
Patient made aware  Faxed script to patients email because she went back to school   She will have this done today

## 2019-12-04 NOTE — TELEPHONE ENCOUNTER
----- Message from Monique Santiago MD sent at 12/3/2019  3:02 PM EST -----  This is High Priority task:  Please make sure patient repeat CBC because of atypical cells that could go with mononucleosis but other possibilities need to be ruled out  If the patient does not get the message please let me know

## 2019-12-05 ENCOUNTER — TELEPHONE (OUTPATIENT)
Dept: OTHER | Facility: OTHER | Age: 19
End: 2019-12-05

## 2019-12-05 NOTE — TELEPHONE ENCOUNTER
[address] [place] [address] 2000  CONFIDENTIALTY NOTICE: This fax transmission is intended only for the addressee  It contains information that is legally privileged,  confidential or otherwise protected from use or disclosure  If you are not the intended recipient, you are strictly prohibited from reviewing,  disclosing, copying using or disseminating any of this information or taking any action in reliance on or regarding this information  If you have  received this fax in error, please notify us immediately by telephone so that we can arrange for its return to us  Page:  3  Call Id: [id number]  Health Call  Standard Call Report  Health Call  Patient Name: [address] [place] [address]  Gender: Female  : 2000  Age: 20 Y 8 M 25 D  Return Phone  Number: [phone] (Current)  Address: Bassam Pedroza   City/State/Zip: [address] [place] [address]  Practice Name:  Practice Charged: [address] [place] [address] [address]  Physician:  [facility] Name:  Relationship To  Patient: Self  Return Phone Number: [phone] (Current)  Presenting Problem: "I have mono and my stomach is not  feeling too good  I want to know what  to do "  Service Type: Triage  Charged Service 1: Janice Graf U  38  Name and  Number:  Nurse Assessment  Nurse: Supa Ohara RN, Florina Castañeda Date/Time: 2019 5:58:29 PM  Type of assessment required:  ---General (Adult or Child)  Duration of Current S/S  ---Yesterday  Location/Radiation  ---GI  Temperature (F) and route:  ---98 9  Symptom Specific Meds (Dose/Time):  ---None  Other S/S  ---Diarrhea  Pain Scale on scale of 1-10, 10 being the worst:  ---2 /10  Symptom progression:  ---worse  Intake and Output  ---WNL/ WNL  [address] [place] [address] 2000  CONFIDENTIALTY NOTICE: This fax transmission is intended only for the addressee  It contains information that is legally privileged,  confidential or otherwise protected from use or disclosure   If you are not the intended recipient, you are strictly prohibited from reviewing,  disclosing, copying using or disseminating any of this information or taking any action in reliance on or regarding this information  If you have  received this fax in error, please notify us immediately by telephone so that we can arrange for its return to us  Page: 2 of 3  Call Id: [id number]  Nurse Assessment  LMP/ Pregnancy:  ---N/A  Breastfeeding  ---No  Last Exam/Treatment:  ---11/27/2019 Mono  Protocols  Protocol Title Nurse Date/Time  Diarrhea OSMANI Ferrer Jock Pioneer 12/5/2019 6:00:45 PM  Question Caller Affirmed  Disp  Time Disposition Final User  12/5/2019 6:05:08 PM Home Care OSMANI Bobo Jock Pioneer  12/5/2019 6:07:41 PM RN Triaged Yes OSMANI Ferrer, [facility] Advice Given Per Protocol  HOME CARE: You should be able to treat this at home  REASSURANCE AND EDUCATION: * Most diarrhea is caused by a viral  infection of the intestines  * Bacterial infections as a cause of diarrhea are uncommon  * Diarrhea is the body's way of getting rid of the  germs  * The main risk of diarrhea is dehydration  Dehydration means the body has lost too much fluid  * Most children with diarrhea  don't need to see their doctor  * Here are some tips on how to keep ahead of fluid losses  MILD DIARRHEA: * Most kids with diarrhea  can eat a normal diet  * Drink more fluids to prevent dehydration  Formula and/or milk are good choices for diarrhea  * Do not use fruit  juices or sports drinks  Reason: They make diarrhea worse  * SOLID FOODS: Eat more starchy foods (such as cereal, crackers, rice,  pasta)  Reason: They are easy to digest  OLDER CHILDREN (OVER 3YEAR OLD) WITH FREQUENT, WATERY DIARRHEA: *  Offer as much fluid as your child will drink  If also eating solid foods, water is fine  If won't eat solid foods, give milk or formula as the  fluid  * Caution: Do not use fruit juices, sports drinks or soft drinks  Reason: They make diarrhea worse  * SOLID FOODS: Starchy foods  are easy to digest and best  Offer cereals, bread, crackers, rice, pasta or mashed potatoes   Pretzels or salty crackers will help add some salt  to meals  Some salt is good  PROBIOTICS: * Probiotics contain healthy bacteria (Lactobacilli) that can replace harmful bacteria in the  gut  * YOGURT in the easiest source of probiotics  * If older than 12 months, give 2 to 6 ounces (60 to 180 ml) of yogurt twice daily  * Note: today, almost all yogurts are 'active culture ' * Yogurts that are lactose-free may be even more helpful  * Probiotic supplements  in liquids, granules, tablets or capsules are also available in health food stores  EXPECTED COURSE: * Viral diarrhea lasts 5-14 days  Severe diarrhea only occurs on the first 1 or 2 days, but loose stools can persist for 1 to 2 weeks  * CONTAGIOUSNESS: Your child  can return to day care or school after the stools are formed and the fever is gone  The toilet-trained child can return if the diarrhea is  mild and the child has good control over loose stools  DEHYDRATION: HOW TO RECOGNIZE * Dehydration is the most important  complication of diarrhea and/or vomiting  * Dehydration means that the body has lost excessive fluids  * The following are signs of  dehydration: * Decreased urination (no urine in more than 8 hours under 1 year, no urine in more than 12 hours over 1 year) occurs early  in the process of dehydration  So does a dark yellow, concentrated yellow  If the urine is light straw colored, your child is not dehydrated  * Dry tongue and inside of the mouth  Dry lips are not helpful  * Decreased or absent tears  * In infants, a depressed or sunken soft spot  CALL BACK IF * Blood in the diarrhea * Signs of dehydration occur * Diarrhea persists over 2 weeks * Your child becomes worse  CARE ADVICE given per Diarrhea (Pediatric) guideline  Caller Understands: Morris Krueger 2000  CONFIDENTIALTY NOTICE: This fax transmission is intended only for the addressee   It contains information that is legally privileged,  confidential or otherwise protected from use or disclosure  If you are not the intended recipient, you are strictly prohibited from reviewing,  disclosing, copying using or disseminating any of this information or taking any action in reliance on or regarding this information  If you have  received this fax in error, please notify us immediately by telephone so that we can arrange for its return to us    Page: 3 of 3  Call Id: [id number]  Caller Disagree/Comply: Comply  PreDisposition: Unsure

## 2019-12-06 ENCOUNTER — TELEPHONE (OUTPATIENT)
Dept: FAMILY MEDICINE CLINIC | Facility: CLINIC | Age: 19
End: 2019-12-06

## 2019-12-06 NOTE — TELEPHONE ENCOUNTER
Patient's father called and would like to speak to [address] [place] [address] regarding his daughter and her labs    Please call him to advise [phone]

## 2019-12-16 ENCOUNTER — OFFICE VISIT (OUTPATIENT)
Dept: FAMILY MEDICINE CLINIC | Facility: CLINIC | Age: 19
End: 2019-12-16
Payer: COMMERCIAL

## 2019-12-16 VITALS
SYSTOLIC BLOOD PRESSURE: 98 MMHG | HEART RATE: 94 BPM | WEIGHT: 124 LBS | BODY MASS INDEX: 19.46 KG/M2 | DIASTOLIC BLOOD PRESSURE: 70 MMHG | OXYGEN SATURATION: 98 % | HEIGHT: 67 IN

## 2019-12-16 DIAGNOSIS — B27.90 INFECTIOUS MONONUCLEOSIS WITHOUT COMPLICATION, INFECTIOUS MONONUCLEOSIS DUE TO UNSPECIFIED ORGANISM: Primary | ICD-10-CM

## 2019-12-16 DIAGNOSIS — R74.8 ELEVATED LIVER ENZYMES: ICD-10-CM

## 2019-12-16 PROCEDURE — 99213 OFFICE O/P EST LOW 20 MIN: CPT | Performed by: INTERNAL MEDICINE

## 2019-12-16 PROCEDURE — 3008F BODY MASS INDEX DOCD: CPT | Performed by: INTERNAL MEDICINE

## 2019-12-16 NOTE — PROGRESS NOTES
Assessment/Plan:         Diagnoses and all orders for this visit:     history of mononucleosis  -     CBC (Includes Diff/Plt) With Pathologist Review; Future  -     Comprehensive metabolic panel; Future    Because of concerns with atypical lymphocytes CBC with review with the pathologist will be obtained  Elevated liver enzymes     Caution with consumption of alcohol and Tylenol  Subjective:      Patient ID: Julia Castellanos is a 20 y o  female  HPI  Patient is here to follow-up on recent episode of mononucleosis  She had quite severe infection with difficulty with swallowing and had to be given prednisone  Liver enzymes were elevated  CBC showed concerning with elevated liver enzyme with concerning atypical lymphocytes  Monospot was positive  This was shared with her father as patient was not in town  Repeat CBC showed normalization of CBC with no abnormal findings  Liver enzymes are still  mildly elevated but much improved  Patient has mild left upper quadrant discomfort and has been advised to stay away from contact sports for at least 3 months total    patient reports feeling back to normal self  She has no sore throat  No fevers or chills  The following portions of the patient's history were reviewed and updated as appropriate: allergies, current medications, past family history, past medical history, past social history, past surgical history and problem list     Review of Systems   Constitutional: Negative for chills, fever and unexpected weight change  HENT: Negative for trouble swallowing  Respiratory: Negative for cough and shortness of breath  Gastrointestinal: Positive for abdominal pain (As above)           Objective:      BP 98/70 (BP Location: Left arm, Patient Position: Sitting, Cuff Size: Standard)   Pulse 94   Ht 5' 7" (1 702 m)   Wt 56 2 kg (124 lb)   SpO2 98%   BMI 19 42 kg/m²          Physical Exam   HENT:   Mouth/Throat: Oropharynx is clear and moist    Cardiovascular: Normal heart sounds  Abdominal: Soft  Bowel sounds are normal  She exhibits no distension and no mass  There is no tenderness  There is no guarding  Cannot fill  palpate splenomegaly   Lymphadenopathy:     She has cervical adenopathy (Resident minimal right than the left cervical lymphadenopathy  )

## 2019-12-20 ENCOUNTER — APPOINTMENT (OUTPATIENT)
Dept: LAB | Facility: HOSPITAL | Age: 19
End: 2019-12-20
Payer: COMMERCIAL

## 2019-12-20 DIAGNOSIS — B27.90 INFECTIOUS MONONUCLEOSIS WITHOUT COMPLICATION, INFECTIOUS MONONUCLEOSIS DUE TO UNSPECIFIED ORGANISM: ICD-10-CM

## 2019-12-20 LAB
ALBUMIN SERPL BCP-MCNC: 4.2 G/DL (ref 3–5.2)
ALP SERPL-CCNC: 76 U/L (ref 43–122)
ALT SERPL W P-5'-P-CCNC: 22 U/L (ref 9–52)
ANION GAP SERPL CALCULATED.3IONS-SCNC: 9 MMOL/L (ref 5–14)
AST SERPL W P-5'-P-CCNC: 30 U/L (ref 14–36)
BILIRUB SERPL-MCNC: 0.5 MG/DL
BUN SERPL-MCNC: 8 MG/DL (ref 5–25)
CALCIUM SERPL-MCNC: 9.6 MG/DL (ref 8.4–10.2)
CHLORIDE SERPL-SCNC: 103 MMOL/L (ref 97–108)
CO2 SERPL-SCNC: 25 MMOL/L (ref 22–30)
CREAT SERPL-MCNC: 0.63 MG/DL (ref 0.6–1.2)
GFR SERPL CREATININE-BSD FRML MDRD: 130 ML/MIN/1.73SQ M
GLUCOSE P FAST SERPL-MCNC: 76 MG/DL (ref 70–99)
POTASSIUM SERPL-SCNC: 4.1 MMOL/L (ref 3.6–5)
PROT SERPL-MCNC: 8.3 G/DL (ref 5.9–8.4)
SODIUM SERPL-SCNC: 137 MMOL/L (ref 137–147)

## 2019-12-20 PROCEDURE — 36415 COLL VENOUS BLD VENIPUNCTURE: CPT | Performed by: INTERNAL MEDICINE

## 2019-12-20 PROCEDURE — 80053 COMPREHEN METABOLIC PANEL: CPT

## 2019-12-20 PROCEDURE — 85027 COMPLETE CBC AUTOMATED: CPT | Performed by: INTERNAL MEDICINE

## 2019-12-20 PROCEDURE — 85025 COMPLETE CBC W/AUTO DIFF WBC: CPT | Performed by: INTERNAL MEDICINE

## 2019-12-20 PROCEDURE — 85007 BL SMEAR W/DIFF WBC COUNT: CPT | Performed by: INTERNAL MEDICINE

## 2019-12-23 ENCOUNTER — TELEPHONE (OUTPATIENT)
Dept: FAMILY MEDICINE CLINIC | Facility: CLINIC | Age: 19
End: 2019-12-23

## 2019-12-23 LAB
BASOPHILS # BLD AUTO: 0 THOUSANDS/ΜL (ref 0–0.1)
BASOPHILS NFR BLD AUTO: 1 % (ref 0–1)
EOSINOPHIL # BLD AUTO: 0.1 THOUSAND/ΜL (ref 0–0.4)
EOSINOPHIL NFR BLD AUTO: 2 % (ref 0–6)
ERYTHROCYTE [DISTWIDTH] IN BLOOD BY AUTOMATED COUNT: 14.9 %
HCT VFR BLD AUTO: 40 % (ref 36–46)
HGB BLD-MCNC: 13.2 G/DL (ref 12–16)
LYMPHOCYTES # BLD AUTO: 1.9 THOUSANDS/ΜL (ref 0.5–4)
LYMPHOCYTES NFR BLD AUTO: 33 % (ref 25–45)
MCH RBC QN AUTO: 27.8 PG (ref 26–34)
MCHC RBC AUTO-ENTMCNC: 33 G/DL (ref 31–36)
MCV RBC AUTO: 84 FL (ref 80–100)
MONOCYTES # BLD AUTO: 0.6 THOUSAND/ΜL (ref 0.2–0.9)
MONOCYTES NFR BLD AUTO: 11 % (ref 1–10)
NEUTROPHILS # BLD AUTO: 3 THOUSANDS/ΜL (ref 1.8–7.8)
NEUTS SEG NFR BLD AUTO: 54 % (ref 45–65)
PATHOLOGIST INTERPRETATION: NORMAL
PATHOLOGY REVIEW: YES
PLATELET # BLD AUTO: 359 THOUSANDS/UL (ref 150–450)
PMV BLD AUTO: 9 FL (ref 8.9–12.7)
RBC # BLD AUTO: 4.73 MILLION/UL (ref 4–5.2)
WBC # BLD AUTO: 5.6 THOUSAND/UL (ref 4.5–11)

## 2019-12-23 NOTE — TELEPHONE ENCOUNTER
----- Message from Aaliyah Santiago MD sent at 12/20/2019  3:08 PM EST -----  Liver enzymes completely normalized

## 2020-04-23 ENCOUNTER — TELEPHONE (OUTPATIENT)
Dept: FAMILY MEDICINE CLINIC | Facility: CLINIC | Age: 20
End: 2020-04-23

## 2020-04-23 LAB — MISCELLANEOUS LAB TEST RESULT: NORMAL

## 2020-09-21 DIAGNOSIS — Z30.41 ENCOUNTER FOR BIRTH CONTROL PILLS MAINTENANCE: ICD-10-CM

## 2020-09-21 RX ORDER — NORGESTIMATE AND ETHINYL ESTRADIOL 7DAYSX3 28
KIT ORAL
Qty: 84 TABLET | Refills: 0 | Status: SHIPPED | OUTPATIENT
Start: 2020-09-21 | End: 2020-12-14 | Stop reason: SDUPTHER

## 2020-12-14 ENCOUNTER — ANNUAL EXAM (OUTPATIENT)
Dept: OBGYN CLINIC | Facility: CLINIC | Age: 20
End: 2020-12-14
Payer: COMMERCIAL

## 2020-12-14 VITALS
SYSTOLIC BLOOD PRESSURE: 102 MMHG | HEIGHT: 67 IN | BODY MASS INDEX: 20.5 KG/M2 | WEIGHT: 130.6 LBS | DIASTOLIC BLOOD PRESSURE: 60 MMHG | TEMPERATURE: 97.5 F | HEART RATE: 92 BPM

## 2020-12-14 DIAGNOSIS — Z30.41 ENCOUNTER FOR BIRTH CONTROL PILLS MAINTENANCE: ICD-10-CM

## 2020-12-14 DIAGNOSIS — Z12.4 CERVICAL CANCER SCREENING: ICD-10-CM

## 2020-12-14 DIAGNOSIS — Z01.419 ENCOUNTER FOR GYNECOLOGICAL EXAMINATION (GENERAL) (ROUTINE) WITHOUT ABNORMAL FINDINGS: Primary | ICD-10-CM

## 2020-12-14 PROCEDURE — 99395 PREV VISIT EST AGE 18-39: CPT | Performed by: OBSTETRICS & GYNECOLOGY

## 2020-12-14 PROCEDURE — G0145 SCR C/V CYTO,THINLAYER,RESCR: HCPCS | Performed by: OBSTETRICS & GYNECOLOGY

## 2020-12-14 RX ORDER — NORGESTIMATE AND ETHINYL ESTRADIOL 7DAYSX3 28
1 KIT ORAL DAILY
Qty: 84 TABLET | Refills: 3 | Status: SHIPPED | OUTPATIENT
Start: 2020-12-14 | End: 2021-10-14

## 2020-12-16 LAB
LAB AP GYN PRIMARY INTERPRETATION: NORMAL
Lab: NORMAL

## 2021-01-11 ENCOUNTER — TELEPHONE (OUTPATIENT)
Dept: PSYCHIATRY | Facility: CLINIC | Age: 21
End: 2021-01-11

## 2021-01-11 NOTE — TELEPHONE ENCOUNTER
Gonzalo Rooney called to schedule a NP appointment with Dr Mukherjee  Please give her a call when you can

## 2021-01-13 ENCOUNTER — TELEPHONE (OUTPATIENT)
Dept: PSYCHIATRY | Facility: CLINIC | Age: 21
End: 2021-01-13

## 2021-01-21 ENCOUNTER — OFFICE VISIT (OUTPATIENT)
Dept: DERMATOLOGY | Facility: CLINIC | Age: 21
End: 2021-01-21
Payer: COMMERCIAL

## 2021-01-21 VITALS — HEIGHT: 67 IN | BODY MASS INDEX: 20.88 KG/M2 | TEMPERATURE: 99.4 F | WEIGHT: 133 LBS

## 2021-01-21 DIAGNOSIS — D48.5 NEOPLASM OF UNCERTAIN BEHAVIOR OF SKIN: ICD-10-CM

## 2021-01-21 DIAGNOSIS — D22.5 MULTIPLE BENIGN NEVI OF UPPER EXTREMITY, LOWER EXTREMITY, AND TRUNK: Primary | ICD-10-CM

## 2021-01-21 DIAGNOSIS — D23.39 DILATED PORE OF WINER OF NOSE: ICD-10-CM

## 2021-01-21 DIAGNOSIS — D22.60 MULTIPLE BENIGN NEVI OF UPPER EXTREMITY, LOWER EXTREMITY, AND TRUNK: Primary | ICD-10-CM

## 2021-01-21 DIAGNOSIS — L21.9 SEBORRHEIC DERMATITIS: ICD-10-CM

## 2021-01-21 DIAGNOSIS — D22.70 MULTIPLE BENIGN NEVI OF UPPER EXTREMITY, LOWER EXTREMITY, AND TRUNK: Primary | ICD-10-CM

## 2021-01-21 PROCEDURE — 99204 OFFICE O/P NEW MOD 45 MIN: CPT | Performed by: DERMATOLOGY

## 2021-01-21 PROCEDURE — 10040 EXTRACTION: CPT | Performed by: DERMATOLOGY

## 2021-01-21 RX ORDER — FLUOCINONIDE TOPICAL SOLUTION USP, 0.05% 0.5 MG/ML
SOLUTION TOPICAL
Qty: 60 ML | Refills: 2 | Status: SHIPPED | OUTPATIENT
Start: 2021-01-21

## 2021-01-21 NOTE — PROGRESS NOTES
[address] [facility] Note     Patient Name: Marko Bowie  Encounter Date: 1/21/2021     Have you been cared for by a [facility] Dermatologist in the last 3 years and, if so, which one? No    · Have you traveled outside of the [address] [place] [address] in the past 3 months or outside of the [facility] area in the last 2 weeks? No     May we call your Preferred Phone number to discuss your specific medical information? Yes     May we leave a detailed message that includes your specific medical information? Yes      Today's Chief Concerns:   Concern #1: Acne on face   Concern #2:  Mole check   Concern #3: Full body skin exam    Past Medical History:  Have you personally ever had or currently have any of the following? · Skin cancer (such as Melanoma, Basal Cell Carcinoma, Squamous Cell Carcinoma? (If Yes, please provide more detail)- No  · Eczema: YES  · Psoriasis: No  · HIV/AIDS: No  · Hepatitis B or C: No  · Tuberculosis: No  · Systemic Immunosuppression such as Diabetes, Biologic or Immunotherapy, Chemotherapy, Organ Transplantation, Bone Marrow Transplantation (If YES, please provide more detail): No  · Radiation Treatment (If YES, please provide more detail): No  · Any other major medical conditions/concerns? (If Yes, which types)- No    Social History:     What is/was your primary occupation? Student     What are your hobbies/past-times? None    Family History:  Have any of your "first degree relatives" (parent, brother, sister, or child) had any of the following       · Skin cancer such as Melanoma or Merkel Cell Carcinoma or Pancreatic Cancer? No  · Eczema, Asthma, Hay Fever or Seasonal Allergies: YES, Seasonal allergies  · Psoriasis or Psoriatic Arthritis: No  · Do any other medical conditions seem to run in your family? If Yes, what condition and which relatives?   No    Current Medications:   (please update all dermatological medications before printing patient's AVS!)      Current Outpatient Medications:     norgestimate-ethinyl estradiol (Tri-Sprintec) 0 18/0 215/0 25 MG-35 MCG per tablet, Take 1 tablet by mouth daily, Disp: 84 tablet, Rfl: 3      Review of Systems:  Have you recently had or currently have any of the following? If YES, what are you doing for the problem? · Fever, chills or unintended weight loss: No  · Sudden loss or change in your vision: No  · Nausea, vomiting or blood in your stool: No  · Painful or swollen joints: No  · Wheezing or cough: No  · Changing mole or non-healing wound: No  · Nosebleeds: No  · Excessive sweating: No  · Easy or prolonged bleeding? No  · Over the last 2 weeks, how often have you been bothered by the following problems? · Taking little interest or pleasure in doing things: 1 - Not at All  · Feeling down, depressed, or hopeless: 2 - Several days  · Rapid heartbeat with epinephrine:  No    · FEMALES ONLY:    · Are you pregnant or planning to become pregnant? No  · Are you currently or planning to be nursing or breast feeding? No    · Any known allergies? Allergies   Allergen Reactions    Amoxicillin Hives    Cefprozil Hives    Ceftibuten     Diphenhydramine Hives    Penicillins          Physical Exam:     Was a chaperone (Derm Clinical Assistant) present throughout the entire Physical Exam? Yes     Did the Dermatology Team specifically  the patient on the importance of a Full Skin Exam to be sure that nothing is missed clinically?  Yes}  o Did the patient ultimately request or accept a Full Skin Exam?  Yes  o Did the patient specifically refuse to have the areas "under-the-bra" examined by the Dermatologist? No  o Did the patient specifically refuse to have the areas "under-the-underwear" examined by the Dermatologist? No    CONSTITUTIONAL:   Vitals:    01/21/21 1256   Temp: 99 4 °F (37 4 °C)   TempSrc: Tympanic   Weight: 60 3 kg (133 lb)   Height: 5' 7" (1 702 m)       PSYCH: Normal mood and affect  EYES: Normal conjunctiva  ENT: Normal lips and oral mucosa  CARDIOVASCULAR: No edema  RESPIRATORY: Normal respirations  HEME/LYMPH/IMMUNO:  No regional lymphadenopathy except as noted below in "ASSESSMENT AND PLAN BY DIAGNOSIS"    SKIN:  FULL ORGAN SYSTEM EXAM   Ears, Face Normal except as noted below in Assessment   Neck, Cervical Chain Nodes Normal except as noted below in Assessment   Right Arm/Hand/Fingers Normal except as noted below in Assessment   Left Arm/Hand/Fingers Normal except as noted below in Assessment   Chest/Breasts/Axillae Viewed areas Normal except as noted below in Assessment   Abdomen, Umbilicus Normal except as noted below in Assessment   Back/Spine Normal except as noted below in Assessment   Groin/Genitalia/Buttocks Normal except as noted below in Assessment   Right Leg, Foot, Toes Normal except as noted below in Assessment   Left Leg, Foot, Toes Normal except as noted below in Assessment        Assessment and Plan by Diagnosis:    History of Present Condition:     Duration:  How long has this been an issue for you?    o  Acne - 2017   Location Affected:  Where on the body is this affecting you?    o  Face   Quality:  Is there any bleeding, pain, itch, burning/irritation, or redness associated with the skin lesion?    o  Denies   Severity:  Describe any bleeding, pain, itch, burning/irritation, or redness on a scale of 1 to 10 (with 10 being the worst)    o  Zero   Timing:  Does this condition seem to be there pretty constantly or do you notice it more at specific times throughout the day?    o  Constant   Context:  Have you ever noticed that this condition seems to be associated with specific activities you do?    o  Denies   Modifying Factors:    o Anything that seems to make the condition worse?    -  Denies  o What have you tried to do to make the condition better?    -  OTC zit cream   Associated Signs and Symptoms:  Does this skin lesion seem to be associated with any of the following:  o  SL AMB DERM SIGNS AND SYMPTOMS: Skin color changes        1  MULTIPLE MELANOCYTIC NEVI ("Moles")     Physical Exam:  · Anatomic Location Affected: Trunk and extremities  · Morphological Description:  Scattered, round to ovoid, symmetrical-appearing, even bordered, skin colored to dark brown macules/papules  · Denies pain, itch, bleeding  No treatments tried  Present for years  Present constantly; no modifying factors which make it worse or better  Denies actively changing or growing moles  Assessment and Plan:  Based on a thorough discussion of this condition and the management approach to it (including a comprehensive discussion of the known risks, side effects and potential benefits of treatment), the patient (family) agrees to implement the following specific plan:  · Reassure benign  · Monitor for changes  · Use sun protection  Apply SPF 30 or higher at least three times a day  Wear sun protecting clothing and hats  Worrisome signs of skin malignancy discussed, questions answered  Regular self-skin check discussed  Advised to call or return to office if patient notices any spots of concern, rapidly growing/changing lesions, bleeding lesions, non-healing lesions  Advised regular SPF use  2  SEBORRHEIC DERMATITIS    Physical Exam:   Anatomic Location Affected:  Scalp   Morphological Description:  White adherant scale on scalp     Additional History of Present Condition:  Present during visit  No treatments tried     Assessment and Plan:  Based on a thorough discussion of this condition and the management approach to it (including a comprehensive discussion of the known risks, side effects and potential benefits of treatment), the patient (family) agrees to implement the following specific plan:  · Start using Lidex 0 05% to the scalp daily for two weeks, then after only as needed       3  NEOPLASM OF UNCERTAIN BEHAVIOR OF SKIN    Physical Exam:   (Anatomic Location); (Size and Morphological Description); (Differential Diagnosis):  o Bilateral nasal ala multiple dermal papules; differential includes adnexal neoplasm, benign    Additional History of Present Condition:  Present for years  Denies pain, itch, bleeding  No treatments tried  Assessment and Plan:   I have discussed with the patient that a sample of skin via a "skin biopsy would be potentially helpful to further make a specific diagnosis under the microscope   Based on a thorough discussion of this condition and the management approach to it (including a comprehensive discussion of the known risks, side effects and potential benefits of treatment), the patient (family) agrees to implement the following specific plan:    Benign, reassurance provided to patient  o No features concerning for malignancy on both clinical and dermatoscopic exam today   o But we do not have a definite diagnosis; patient will think about it and let us know if she wants to proceed with a biopsy       4  DILATED PORE  Physical Exam:   Anatomic Location Affected:  Mid Forehead   Morphological Description:  Dilated pore    Additional History of Present Condition:  Present for a month    Assessment and Plan:  Based on a thorough discussion of this condition and the management approach to it (including a comprehensive discussion of the known risks, side effects and potential benefits of treatment), the patient (family) agrees to implement the following specific plan:   Extracted the pore with a Comedone       - Risks, benefits, expectations of extraction discussed, including infection, scarring, bruising, dyspigmentation and recurrence; site cleansed with alcohol prep pad  Surface of lesion(s) nicked with 18-gauge needle and contents expressed using comedone extractor and cotton-tipped applicators  Petroleum jelly applied  Pt tolerated well  Wound care instructions provided           Scribe Attestation    I,:  Sue Kim am acting as a scribe while in the presence of the attending physician :       I,:  Roxanne Esparza MD personally performed the services described in this documentation    as scribed in my presence :

## 2021-01-21 NOTE — PATIENT INSTRUCTIONS
Assessment and Plan:  Based on a thorough discussion of this condition and the management approach to it (including a comprehensive discussion of the known risks, side effects and potential benefits of treatment), the patient (family) agrees to implement the following specific plan:  · Reassure benign  · Monitor for changes  · Use sun protection  Apply SPF 30 or higher at least three times a day  Wear sun protecting clothing and hats  Worrisome signs of skin malignancy discussed, questions answered  Regular self-skin check discussed  Advised to call or return to office if patient notices any spots of concern, rapidly growing/changing lesions, bleeding lesions, non-healing lesions  Advised regular SPF use  Seborrheic Dermatitis   Seborrheic dermatitis is a common, chronic or relapsing form of eczema/dermatitis that mainly affects the sebaceous, gland-rich regions of the scalp, face, and trunk  There are infantile and adult forms of seborrhoeic dermatitis  It is sometimes associated with psoriasis and, in that clinical scenario, may be referred to as "sebo-psoriasis "  Seborrheic dermatitis is also known as "seborrheic eczema "  Dandruff (also called "pityriasis capitis") is an uninflamed form of seborrhoeic dermatitis  Dandruff presents as bran-like scaly patches scattered within hair-bearing areas of the scalp  In an infant, this condition may be referred to as "cradle cap "  The cause of seborrheic dermatitis is not completely understood  It is associated with proliferation of various species of the skin commensal Malassezia, in its yeast (non-pathogenic) form  Its metabolites (such as the fatty acids oleic acid, malssezin, and indole-3-carbaldehyde) may cause an inflammatory reaction  Differences in skin barrier lipid content and function may account for individual presentations      Adult Seborrheic Dermatitis  Adult seborrheic dermatitis tends to begin in late adolescence; prevalence is greatest in young adults and in the elderly  It is more common in males than in females  The following factors are sometimes associated with severe adult seborrheic dermatitis:   Oily skin   Familial tendency to seborrhoeic dermatitis or a family history of psoriasis   Immunosuppression: organ transplant recipient, human immunodeficiency virus (HIV) infection and patients with lymphoma   Neurological and psychiatric diseases: Parkinson disease, tardive dyskinesia, depression, epilepsy, facial nerve palsy, spinal cord injury and congenital disorders such as Down syndrome   Treatment for psoriasis with psoralen and ultraviolet A (PUVA) therapy   Lack of sleep   Stressful events  In adults, seborrheic dermatitis may typically affect the scalp, face (creases around the nose, behind ears, within eyebrows) and upper trunk  Typical clinical features include:   Winter flares, improving in summer following sun exposure   Minimal itch most of the time   Combination oily and dry mid-facial skin   Ill-defined localized scaly patches or diffuse scale in the scalp   Blepharitis; scaly red eyelid margins   [place]-pink, thin, scaly, and ill-defined plaques in skin folds on both sides of the face   Petal or ring-shaped flaky patches on hair-line and on anterior chest   Rash in armpits, under the breasts, in the groin folds and genital creases   Superficial folliculitis (inflamed hair follicles) on cheeks and upper trunk    Seborrheic dermatitis is diagnosed by its clinical appearance and behavior  Skin biopsy may be helpful but is rarely necessary to make this diagnosis  Assessment and Plan:  Based on a thorough discussion of this condition and the management approach to it (including a comprehensive discussion of the known risks, side effects and potential benefits of treatment), the patient (family) agrees to implement the following specific plan:   Extracted the pore with a Comedone

## 2021-02-10 ENCOUNTER — TELEMEDICINE (OUTPATIENT)
Dept: FAMILY MEDICINE CLINIC | Facility: CLINIC | Age: 21
End: 2021-02-10
Payer: COMMERCIAL

## 2021-02-10 ENCOUNTER — TELEPHONE (OUTPATIENT)
Dept: PSYCHIATRY | Facility: CLINIC | Age: 21
End: 2021-02-10

## 2021-02-10 DIAGNOSIS — F39 MOOD DISORDER (HCC): Primary | ICD-10-CM

## 2021-02-10 PROCEDURE — 99213 OFFICE O/P EST LOW 20 MIN: CPT | Performed by: INTERNAL MEDICINE

## 2021-02-10 NOTE — PROGRESS NOTES
Virtual Brief Visit    Assessment/Plan:    Problem List Items Addressed This Visit     None      Visit Diagnoses     Mood disorder ([facility] [address] )    -  Primary        Follow-up with Psychiatry          Reason for visit is   Chief Complaint   Patient presents with    Virtual Brief Visit        Encounter provider Kieran Rodriguez MD    Provider located at [facility], [facility] [address] [facility] , [address]  [facility] [phone]    Recent Visits  No visits were found meeting these conditions  Showing recent visits within past 7 days and meeting all other requirements     Today's Visits  Date Type Provider Dept   02/10/21 Telemedicine Kieran Rodriguez MD [address] today's visits and meeting all other requirements     Future Appointments  No visits were found meeting these conditions  Showing future appointments within next 150 days and meeting all other requirements        After connecting through [employer] and patient was informed that this is a secure, HIPAA-compliant platform  She agrees to proceed  , the patient was identified by name and date of birth  Kris Aguilera was informed that this is a telemedicine visit and that the visit is being conducted through [facility] and patient was informed that this is a secure, HIPAA-compliant platform  She agrees to proceed     My office door was closed  No one else was in the room  She acknowledged consent and understanding of privacy and security of the platform  The patient has agreed to participate and understands she can discontinue the visit at any time  Patient is aware this is a billable service  Subjective    Kris Aguilera is a 24 y o  female   Sebastian Goode HPI    patient is being seen by the therapist who suggested she start medication  She has been referred to  Psychiatrist   Appears patient  was contacted twice by [address]  I encouraged patient to follow with them  Patient reports she has been diagnosed with anxiety depression OCD  Past Medical History:   Diagnosis Date    Fibrocystic breast        Past Surgical History:   Procedure Laterality Date    EYE SURGERY      MOUTH SURGERY         Current Outpatient Medications   Medication Sig Dispense Refill    fluocinonide (LIDEX) 0 05 % external solution Apply to the scalp daily for two weeks after only as needed  60 mL 2    norgestimate-ethinyl estradiol (Tri-Sprintec) 0 18/0 215/0 25 MG-35 MCG per tablet Take 1 tablet by mouth daily 84 tablet 3     No current facility-administered medications for this visit  Allergies   Allergen Reactions    Amoxicillin Hives    Cefprozil Hives    Ceftibuten     Diphenhydramine Hives    Penicillins        Review of Systems    There were no vitals filed for this visit  I spent 10 minutes with patient today in which greater than 50% of the time was spent in counseling/coordination of care regarding  plan of    [address] acknowledges that she has consented to an online visit or consultation  She understands that the online visit is based solely on information provided by her, and that, in the absence of a face-to-face physical evaluation by the physician, the diagnosis she receives is both limited and provisional in terms of accuracy and completeness  This is not intended to replace a full medical face-to-face evaluation by the physician  [facility] understands and accepts these terms

## 2021-02-10 NOTE — TELEPHONE ENCOUNTER
Behavorial Health Outpatient Intake Questions    Referred by: self    Please advised interviewee that they need to answer all questions truthfully to allow for best care and any misrepresentations of information may affect their ability to be seen at this clinic   => Was this discussed? Yes     [facility]     Presenting Problem (in patient's words): trouble with OCD,anxiety, depression  Are there any developmental disabilities? ? If yes, can they speak to you on the phone? If they are too limited to speak to you on phone, refer out No    Are you taking any psychiatric medications? No    => If yes, who prescribes? If yes, are they injectable medications? Does the patient have a language barrier or hearing impairment? No    Have you been treated at [facility] by a therapist or a doctor in the past? If yes, who? No    Has the patient been hospitalized for mental health? No   If yes, how long ago was last hospitalization and where was it? Do you actively use alcohol or marijuana or illegal substances? If yes, what and how much - refer out to Drug and alcohol treatment if use is excessive or daily use of illegal substances No concerns of substance abuse are reported  Do you have a community treatment team or ? No    Legal History-     Does the patient have any history of arrests, halfway/retirement time, or DUIs? No  If Yes-  1) What types of charges? 2) When were they last incarcerated? 3) Are they currently on parole or probation? Minor Child-    Who has custody of the child? Is there a custody agreement? If there is a custody agreement remind parent that they must bring a copy to the first appt or they will not be seen       Intake Team, please check with provider before scheduling if flags come up such as:  - complex case  - legal history (other than DUI)  - communication barrier concerns are present  - if, in your judgment, this needs further review    ACCEPTED as a patient Yes  => Appointment Date: dr Nickolas Potter 3/23/21    Referred Elsewhere? No    Name of Insurance Co: [employer] ID# [id number]  [id number] Phone #  If ins is primary or secondary  If patient is a minor, parents information such as Name, D  O B of guarantor

## 2021-03-23 ENCOUNTER — TELEMEDICINE (OUTPATIENT)
Dept: PSYCHIATRY | Facility: CLINIC | Age: 21
End: 2021-03-23
Payer: COMMERCIAL

## 2021-03-23 DIAGNOSIS — F41.1 GENERALIZED ANXIETY DISORDER: ICD-10-CM

## 2021-03-23 DIAGNOSIS — F33.2 MAJOR DEPRESSIVE DISORDER, RECURRENT SEVERE WITHOUT PSYCHOTIC FEATURES (HCC): ICD-10-CM

## 2021-03-23 DIAGNOSIS — F42.2 MIXED OBSESSIONAL THOUGHTS AND ACTS: Primary | ICD-10-CM

## 2021-03-23 PROCEDURE — 99204 OFFICE O/P NEW MOD 45 MIN: CPT | Performed by: PSYCHIATRY & NEUROLOGY

## 2021-03-23 NOTE — PATIENT INSTRUCTIONS
Mixed obsessional thoughts and acts  -     sertraline (ZOLOFT) 50 mg tablet; Take 0 5 tablets (25 mg total) by mouth daily For 1 week, then increase to 50mg x 2 weeks, then 100mg    Generalized anxiety disorder  -     sertraline (ZOLOFT) 50 mg tablet; Take 0 5 tablets (25 mg total) by mouth daily For 1 week, then increase to 50mg x 2 weeks, then 100mg    Major depressive disorder, recurrent severe without psychotic features (HCC)  -     sertraline (ZOLOFT) 50 mg tablet;  Take 0 5 tablets (25 mg total) by mouth daily For 1 week, then increase to 50mg x 2 weeks, then 100mg      follow up in 2 months  Continue seeing personal therapist
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
## 2021-03-23 NOTE — BH TREATMENT PLAN
TREATMENT PLAN (Medication Management Only)        [facility]    Name and Date of Birth:  Sarina Holcomb 24 y o  2000  Date of Treatment Plan: March 23, 2021  Diagnosis/Diagnoses:    1  Mixed obsessional thoughts and acts    2  Generalized anxiety disorder    3  Major depressive disorder, recurrent severe without psychotic features [facility])      Strengths/Personal Resources for Self-Care: "creative  I am very hard working  I am fun an funny  I can be very understanding of people "  Area/Areas of need (in own words): "I want to work on not hating myself  Being self sufficient, but being able to reach out when needed ", anxiety symptoms, depressive symptoms, low self-esteem, obsessive-compulsive symptoms  1  Long Term Goal: to have a better outlook on things     Target Date:6 months - 9/23/2021  Person/Persons responsible for completion of goal: Dr Wallace Ho  2  Short Term Objective (s) - How will we reach this goal?:   A  Provider new recommended medication/dosage changes and/or continue medication(s): Medication changes: I am having Katey Whelan start on sertraline  I am also having her maintain her norgestimate-ethinyl estradiol and fluocinonide     B  N/A   C  N/A  Target Date:6 months - 9/23/2021  Person/Persons Responsible for Completion of Goal: Dr Elio Ho Goals: starting treatment  Treatment Modality: medication management every 2 months  Review due 180 days from date of this plan: 6 months - 9/23/2021  Expected length of service: ongoing treatment  My Physician/PA/NP and I have developed this plan together and I agree to work on the goals and objectives  I understand the treatment goals that were developed for my treatment    Treatment Plan done but not signed at time of office visit due to:  Plan reviewed by phone or in person  and verbal consent given due to [place] social distancing

## 2021-03-31 DIAGNOSIS — Z23 ENCOUNTER FOR IMMUNIZATION: ICD-10-CM

## 2021-04-06 ENCOUNTER — TELEPHONE (OUTPATIENT)
Dept: PSYCHIATRY | Facility: CLINIC | Age: 21
End: 2021-04-06

## 2021-04-06 NOTE — TELEPHONE ENCOUNTER
Maya Obreogn called wanted to know if she can switch her Zoloft from taking it in the morning to taking it in the afternoon  Patient would like a call back

## 2021-04-12 ENCOUNTER — IMMUNIZATIONS (OUTPATIENT)
Dept: FAMILY MEDICINE CLINIC | Facility: HOSPITAL | Age: 21
End: 2021-04-12

## 2021-04-12 DIAGNOSIS — Z23 ENCOUNTER FOR IMMUNIZATION: Primary | ICD-10-CM

## 2021-04-12 PROCEDURE — 91301 SARS-COV-2 / COVID-19 MRNA VACCINE (MODERNA) 100 MCG: CPT

## 2021-04-12 PROCEDURE — 0011A SARS-COV-2 / COVID-19 MRNA VACCINE (MODERNA) 100 MCG: CPT

## 2021-05-12 ENCOUNTER — IMMUNIZATIONS (OUTPATIENT)
Dept: FAMILY MEDICINE CLINIC | Facility: HOSPITAL | Age: 21
End: 2021-05-12

## 2021-05-12 DIAGNOSIS — Z23 ENCOUNTER FOR IMMUNIZATION: Primary | ICD-10-CM

## 2021-05-12 PROCEDURE — 91301 SARS-COV-2 / COVID-19 MRNA VACCINE (MODERNA) 100 MCG: CPT

## 2021-05-12 PROCEDURE — 0012A SARS-COV-2 / COVID-19 MRNA VACCINE (MODERNA) 100 MCG: CPT

## 2021-05-25 ENCOUNTER — TELEMEDICINE (OUTPATIENT)
Dept: PSYCHIATRY | Facility: CLINIC | Age: 21
End: 2021-05-25
Payer: COMMERCIAL

## 2021-05-25 DIAGNOSIS — F42.2 MIXED OBSESSIONAL THOUGHTS AND ACTS: ICD-10-CM

## 2021-05-25 DIAGNOSIS — F33.2 MAJOR DEPRESSIVE DISORDER, RECURRENT SEVERE WITHOUT PSYCHOTIC FEATURES (HCC): ICD-10-CM

## 2021-05-25 DIAGNOSIS — F41.1 GENERALIZED ANXIETY DISORDER: Primary | ICD-10-CM

## 2021-05-25 PROCEDURE — 99214 OFFICE O/P EST MOD 30 MIN: CPT | Performed by: PSYCHIATRY & NEUROLOGY

## 2021-05-25 RX ORDER — SERTRALINE HYDROCHLORIDE 100 MG/1
150 TABLET, FILM COATED ORAL DAILY
Qty: 90 TABLET | Refills: 1 | Status: SHIPPED | OUTPATIENT
Start: 2021-05-25 | End: 2021-08-27 | Stop reason: SDUPTHER

## 2021-05-25 NOTE — PSYCH
Virtual Regular Visit      Assessment/Plan:    Problem List Items Addressed This Visit     None      Visit Diagnoses     Generalized anxiety disorder    -  Primary    Relevant Medications    sertraline (ZOLOFT) 100 mg tablet    Mixed obsessional thoughts and acts        Relevant Medications    sertraline (ZOLOFT) 100 mg tablet    Major depressive disorder, recurrent severe without psychotic features (HCC)        Relevant Medications    sertraline (ZOLOFT) 100 mg tablet                    Reason for visit is   Chief Complaint   Patient presents with    Medication Management    Virtual Regular Visit        Encounter provider Jd Mustafa MD    Provider located at [place] [phone]      Recent Visits  No visits were found meeting these conditions  Showing recent visits within past 7 days and meeting all other requirements     Today's Visits  Date Type Provider Dept   05/25/21 Telemedicine MD [facility] today's visits and meeting all other requirements     Future Appointments  No visits were found meeting these conditions  Showing future appointments within next 150 days and meeting all other requirements        The patient was identified by name and date of birth  Betsy Carlos was informed that this is a telemedicine visit and that the visit is being conducted through [address] [place] [address] Now and patient was informed that this is a secure, HIPAA-compliant platform  She agrees to proceed     My office door was closed  No one else was in the room  She acknowledged consent and understanding of privacy and security of the video platform  The patient has agreed to participate and understands they can discontinue the visit at any time  Patient is aware this is a billable service       Subjective:     Patient ID: Betsy Carlos is a 24 y o  female with J CARLOS, MDD, and OCD being seen for a follow up  She is on Zoloft  HPI ROS Appetite Changes and Sleep: the patient stated that her mood has been a lot better  She is doing well on Zoloft without side effects past the first few weeks with being tired and then she changed it to a night time dose and this resolved  Her libido is a little less than before, but this is not of importance to her at this time  She is staying in [facility] until  for free (her sisters appt) and then moving in with her parents until she goes back to school and live in her own apt  She is sleeping okay  She is sleeping on a blow up mattress and getting 7 hours a night  Appetite is good  No changes in her weight  Depression is better  Anxiety is still there at times, but this is much more manageable and better now  She is able to talk herself out things easier now  One panic attack when her aunt   She denied SI/HI  OCD is still present, but not as pronounced  She has been on 100mg for a while now  Review Of Systems:     Mood Anxiety and Depression better   Behavior Compulsive Behavior   Thought Content Disturbing Thoughts, Feelings   General Emotional Problems and Sleep Disturbances   Personality Normal   Other Psych Symptoms Normal   Constitutional Negative   ENT Negative   Cardiovascular Negative   Respiratory Negative   Gastrointestinal Negative   Genitourinary Negative   Musculoskeletal Negative   Integumentary Negative   Neurological Negative   Endocrine Normal    Other Symptoms Normal              Laboratory Results: No results found for this or any previous visit      Substance Abuse History:  Social History     Substance and Sexual Activity   Drug Use No       Family Psychiatric History:   Family History   Problem Relation Age of Onset    Lung disease Mother     Anxiety disorder Mother     Hyperlipidemia Father     Personality disorder Sister     Alcohol abuse Sister     Anxiety disorder Brother     Depression Brother     No Known Problems Maternal Grandmother     No Known Problems Maternal Grandfather     No Known Problems Paternal Grandmother     No Known Problems Paternal Grandfather     Bipolar disorder Maternal Aunt     Alcohol abuse Cousin     Suicide Attempts Cousin        The following portions of the patient's history were reviewed and updated as appropriate: allergies, current medications, past family history, past medical history, past social history, past surgical history and problem list     Social History     Socioeconomic History    Marital status: Single     Spouse name: Not on file    Number of children: 0    Years of education: 12    Highest education level:  Bachelor's degree (e g , BA, AB, BS)   Occupational History    Occupation: student     Comment: [employer]   Social Needs    Financial resource strain: Not hard at all   [employer] insecurity     Worry: Never true     Inability: Never true   [employer] needs     Medical: No     Non-medical: No   Tobacco Use    Smoking status: Never Smoker    Smokeless tobacco: Never Used   Substance and Sexual Activity    Alcohol use: Yes     Frequency: 2-3 times a week     Drinks per session: 3 or 4     Binge frequency: Monthly     Comment: occ    Drug use: No    Sexual activity: Not Currently     Partners: Male     Birth control/protection: OCP     Comment: uses birth control   Lifestyle    Physical activity     Days per week: 2 days     Minutes per session: 60 min    Stress: Very much   Relationships    Social connections     Talks on phone: More than three times a week     Gets together: More than three times a week     Attends Anabaptist service: Never     Active member of club or organization: No     Attends meetings of clubs or organizations: Never     Relationship status: Never     Intimate partner violence     Fear of current or ex partner: No     Emotionally abused: No     Physically abused: No     Forced sexual activity: No   Other Topics Concern  Not on file   Social History Narrative    No caffeine use     Social History     Social History Narrative    No caffeine use       Objective:       Mental status:  Appearance calm and cooperative , adequate hygiene and grooming and good eye contact    Mood euthymic   Affect affect was broad   Speech a normal rate   Thought Processes coherent/organized and normal thought processes   Hallucinations no hallucinations present    Thought Content no delusions   Abnormal Thoughts no suicidal thoughts  and no homicidal thoughts    Orientation  oriented to person and place and time   Remote Memory short term memory intact and long term memory intact   Attention Span concentration intact   Intellect Appears to be of Average Intelligence   Insight Insight intact   Judgement judgment was intact   Muscle Strength Muscle strength and tone were normal and gait not assessed   Language no difficulty naming common objects and no difficulty repeating a phrase    Fund of Knowledge displays adequate knowledge of current events and adequate fund of knowledge regarding past history   Pain none   Pain Scale 0       Assessment/Plan:       Diagnoses and all orders for this visit:    Generalized anxiety disorder  -     sertraline (ZOLOFT) 100 mg tablet; Take 1 5 tablets (150 mg total) by mouth daily    Mixed obsessional thoughts and acts  -     sertraline (ZOLOFT) 100 mg tablet; Take 1 5 tablets (150 mg total) by mouth daily    Major depressive disorder, recurrent severe without psychotic features (HCC)  -     sertraline (ZOLOFT) 100 mg tablet; Take 1 5 tablets (150 mg total) by mouth daily        Increase zoloft 150mg at bedtime  Follow up in 8 weeks    Treatment Recommendations- Risks Benefits      Immediate Medical/Psychiatric/Psychotherapy Treatments and Any Precautions: the patient has improved a lot since being on Zoloft  Having some decreased libido, but this is not a concern for her at this time   Still having anxiety and OCD symptoms will increase to 150mg  Risks, Benefits And Possible Side Effects Of Medications:  {PSYCH RISK, BENEFITS AND POSSIBLE SIDE EFFECTS discussed    Controlled Medication Discussion: No records found for controlled prescriptions according to Josefina Chase 17       I spent 15 minutes with patient today in which greater than 50% of the time was spent in counseling/coordination of care regarding treatment      VIRTUAL VISIT 79 Shawna Benites acknowledges that she has consented to an online visit or consultation  She understands that the online visit is based solely on information provided by her, and that, in the absence of a face-to-face physical evaluation by the physician, the diagnosis she receives is both limited and provisional in terms of accuracy and completeness  This is not intended to replace a full medical face-to-face evaluation by the physician  [facility] understands and accepts these terms

## 2021-05-25 NOTE — PATIENT INSTRUCTIONS
Generalized anxiety disorder    Mixed obsessional thoughts and acts    Major depressive disorder, recurrent severe without psychotic features ([address] )        Increase zoloft 150mg at bedtime  Follow up in 8 weeks

## 2021-08-27 ENCOUNTER — TELEMEDICINE (OUTPATIENT)
Dept: PSYCHIATRY | Facility: CLINIC | Age: 21
End: 2021-08-27
Payer: COMMERCIAL

## 2021-08-27 DIAGNOSIS — F42.2 MIXED OBSESSIONAL THOUGHTS AND ACTS: ICD-10-CM

## 2021-08-27 DIAGNOSIS — F33.2 MAJOR DEPRESSIVE DISORDER, RECURRENT SEVERE WITHOUT PSYCHOTIC FEATURES (HCC): ICD-10-CM

## 2021-08-27 DIAGNOSIS — F41.1 GENERALIZED ANXIETY DISORDER: Primary | ICD-10-CM

## 2021-08-27 DIAGNOSIS — F33.0 MAJOR DEPRESSIVE DISORDER, RECURRENT, MILD (HCC): ICD-10-CM

## 2021-08-27 PROCEDURE — 99214 OFFICE O/P EST MOD 30 MIN: CPT | Performed by: PSYCHIATRY & NEUROLOGY

## 2021-08-27 PROCEDURE — 90833 PSYTX W PT W E/M 30 MIN: CPT | Performed by: PSYCHIATRY & NEUROLOGY

## 2021-08-27 RX ORDER — SERTRALINE HYDROCHLORIDE 100 MG/1
100 TABLET, FILM COATED ORAL DAILY
Qty: 90 TABLET | Refills: 2 | Status: SHIPPED | OUTPATIENT
Start: 2021-08-27 | End: 2022-04-27 | Stop reason: SDUPTHER

## 2021-08-27 NOTE — PATIENT INSTRUCTIONS
Generalized anxiety disorder  -     sertraline (ZOLOFT) 100 mg tablet; Take 1 tablet (100 mg total) by mouth daily    Mixed obsessional thoughts and acts  -     sertraline (ZOLOFT) 100 mg tablet; Take 1 tablet (100 mg total) by mouth daily    Major depressive disorder, recurrent, mild (HCC)    Major depressive disorder, recurrent severe without psychotic features (HCC)  -     sertraline (ZOLOFT) 100 mg tablet;  Take 1 tablet (100 mg total) by mouth daily        Follow up in 6 months

## 2021-08-27 NOTE — BH TREATMENT PLAN
TREATMENT PLAN (Medication Management Only)        [facility]    Name and Date of Birth:  London Rdz 24 y o  2000  Date of Treatment Plan: August 27, 2021  Diagnosis/Diagnoses:    1  Generalized anxiety disorder    2  Mixed obsessional thoughts and acts    3  Major depressive disorder, recurrent, mild ([facility] [address] )    4  Major depressive disorder, recurrent severe without psychotic features [facility])      Strengths/Personal Resources for Self-Care: "I am pretty good at managing things on my own  I can put intrusive thoughts to the back of my mind a lot faster now then I was able to do it in the past  "   Area/Areas of need (in own words): "I need to work on reassurance from others and myself  "  1  Long Term Goal: "to work on not hating my past self  treat myself better "  Target Date:6 months - 2/27/2022  Person/Persons responsible for completion of goal: Helen Landry, Dr Eleazar Lopez  2  Short Term Objective (s) - How will we reach this goal?:   A  Provider new recommended medication/dosage changes and/or continue medication(s): Medication changes: I have changed Prosper Whelan's sertraline  I am also having her maintain her norgestimate-ethinyl estradiol and fluocinonide     B  N/A   C  N/A  Target Date:6 months - 2/27/2022  Person/Persons Responsible for Completion of Goal: Dr Eleazar Andersen  Progress Towards Goals: continuing treatment  Treatment Modality: medication management every 6 months  Review due 180 days from date of this plan: 6 months - 2/27/2022  Expected length of service: ongoing treatment  My Physician/PA/NP and I have developed this plan together and I agree to work on the goals and objectives  I understand the treatment goals that were developed for my treatment    Treatment Plan done but not signed at time of office visit due to:  Plan reviewed by phone or in person  and verbal consent given due to [place] social distancing

## 2021-08-27 NOTE — PSYCH
Virtual Regular Visit    Verification of patient location:    Patient is located in the following state in which I hold an active license [place]      Assessment/Plan:    Problem List Items Addressed This Visit     None      Visit Diagnoses     Generalized anxiety disorder    -  Primary    Relevant Medications    sertraline (ZOLOFT) 100 mg tablet    Mixed obsessional thoughts and acts        Relevant Medications    sertraline (ZOLOFT) 100 mg tablet    Major depressive disorder, recurrent, mild (HCC)        Relevant Medications    sertraline (ZOLOFT) 100 mg tablet    Major depressive disorder, recurrent severe without psychotic features (HCC)        Relevant Medications    sertraline (ZOLOFT) 100 mg tablet                   Reason for visit is   Chief Complaint   Patient presents with    Medication Management    Virtual Regular Visit        Encounter provider Dipak Vasquez MD    Provider located at [address] [address] [phone]      Recent Visits  No visits were found meeting these conditions  Showing recent visits within past 7 days and meeting all other requirements  Today's Visits  Date Type Provider Dept   08/27/21 [address],  [facility] today's visits and meeting all other requirements  Future Appointments  No visits were found meeting these conditions  Showing future appointments within next 150 days and meeting all other requirements       The patient was identified by name and date of birth  Satinder Mendosa was informed that this is a telemedicine visit and that the [facility] and patient was informed that this is a secure, HIPAA-compliant platform  She agrees to proceed     My office door was closed  No one else was in the room  She acknowledged consent and understanding of privacy and security of the video platform   The patient has agreed to participate and understands they can discontinue the visit at any time  Patient is aware this is a billable service  Subjective:     Patient ID: Derek Torres is a 24 y o  female with J CARLOS, MDD, and OCD being seen for a follow up  She is on Zoloft  she was a no- show for her appt last month  HPI ROS Appetite Changes and Sleep: the patient stated that she is doing good  She is now back to [facility]  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  She is still taking Zoloft 150mg  She stated that she feels that she was doing better on 100mg because she feels she was focusing on the intrusive thoughts more so when she first started the 150mg vs when she was on the 100mg  She is having fewer of them now  The intrusive thoughts are being afraid of saying something wrong and this is what she was fixating on before  She denied other side effects  Depression is controlled and when she was home, it was easier for her  Anxiety is also controlled  She was worried about school and teaching, but she now doesn't feel overwhelmed by it  She is now only anxious about being back in school and COVID, but this is nothing she can control and she is aware of this  Appetite is good, no weight changes  Sleep is good  She denied SI/HI  Review Of Systems:     Mood Anxiety and Depression both improved   Behavior Normal    Thought Content Disturbing Thoughts, Feelings   General Normal    Personality Normal   Other Psych Symptoms Normal   Constitutional As Noted in HPI   ENT Negative   Cardiovascular Negative   Respiratory Negative   Gastrointestinal Negative   Genitourinary Negative   Musculoskeletal Negative   Integumentary Negative   Neurological Negative   Endocrine Normal    Other Symptoms Normal              Laboratory Results: No results found for this or any previous visit      Substance Abuse History:  Social History     Substance and Sexual Activity   Drug Use No       Family Psychiatric History:   Family History   Problem Relation Age of Onset    Lung disease Mother     Anxiety disorder Mother     Hyperlipidemia Father     Personality disorder Sister     Alcohol abuse Sister     Anxiety disorder Brother     Depression Brother     No Known Problems Maternal Grandmother     No Known Problems Maternal Grandfather     No Known Problems Paternal Grandmother     No Known Problems Paternal Grandfather     Bipolar disorder Maternal Aunt     Alcohol abuse Cousin     Suicide Attempts Cousin        The following portions of the patient's history were reviewed and updated as appropriate: allergies, current medications, past family history, past medical history, past social history, past surgical history and problem list     Social History     Socioeconomic History    Marital status: Single     Spouse name: Not on file    Number of children: 0    Years of education: 12    Highest education level: Bachelor's degree (e g , BA, AB, BS)   Occupational History    Occupation: student     Comment: [employer]   Tobacco Use    Smoking status: Never Smoker    Smokeless tobacco: Never Used   Vaping Use    Vaping Use: Never used   Substance and Sexual Activity    Alcohol use: Yes     Comment: occ    Drug use: No    Sexual activity: Not Currently     Partners: Male     Birth control/protection: OCP     Comment: uses birth control   Other Topics Concern    Not on file   Social History Narrative    No caffeine use     Social Determinants of Health     Financial Resource Strain: Low Risk     Difficulty of Paying Living Expenses: Not hard at all   Food Insecurity: No Food Insecurity    Worried About Running Out of Food in the Last Year: Never true    [place] of Food in the Last Year: Never true   Transportation Needs: No Transportation Needs    Lack of Transportation (Medical): No    Lack of Transportation (Non-Medical):  No   Physical Activity: Insufficiently Active    Days of Exercise per Week: 2 days    Minutes of Exercise per Session: 60 min   Stress: Stress Concern Present    Feeling of Stress : Very much   Social Connections: Socially Isolated    Frequency of Communication with Friends and Family: More than three times a week    Frequency of Social Gatherings with Friends and Family: More than three times a week    Attends Scientology Services: Never    Active Member of Clubs or Organizations: No    Attends Club or Organization Meetings: Never    Marital Status: Never    Intimate Partner Violence: Not At Risk    Fear of Current or Ex-Partner: No    Emotionally Abused: No    Physically Abused: No    Sexually Abused: No     Social History     Social History Narrative    No caffeine use       Objective:       Mental status:  Appearance calm and cooperative , adequate hygiene and grooming and good eye contact    Mood euthymic   Affect affect was broad   Speech a normal rate   Thought Processes coherent/organized and normal thought processes   Hallucinations no hallucinations present    Thought Content no delusions   Abnormal Thoughts no suicidal thoughts  and no homicidal thoughts    Orientation  oriented to person and place and time   Remote Memory short term memory intact and long term memory intact   Attention Span concentration intact   Intellect Appears to be of Average Intelligence   Insight Insight intact   Judgement judgment was intact   Muscle Strength Muscle strength and tone were normal and Normal gait    Language no difficulty naming common objects and no difficulty repeating a phrase    Fund of Knowledge displays adequate knowledge of current events and adequate fund of knowledge regarding past history   Pain none   Pain Scale 0       Assessment/Plan:       Diagnoses and all orders for this visit:    Generalized anxiety disorder  -     sertraline (ZOLOFT) 100 mg tablet;  Take 1 tablet (100 mg total) by mouth daily    Mixed obsessional thoughts and acts  -     sertraline (ZOLOFT) 100 mg tablet; Take 1 tablet (100 mg total) by mouth daily    Major depressive disorder, recurrent, mild (HCC)    Major depressive disorder, recurrent severe without psychotic features (HCC)  -     sertraline (ZOLOFT) 100 mg tablet; Take 1 tablet (100 mg total) by mouth daily        Follow up in 6 months    Treatment Recommendations- Risks Benefits      Immediate Medical/Psychiatric/Psychotherapy Treatments and Any Precautions: she is doing well  Has moved back to [facility] and started her semester  She was having more intrusive thoughts on 150mg in the beginning, but this is lessened now  She had fewer intrusive thoughts on 100mg and wishes to go back to that dose  Will change her back  No safety concerns  Risks, Benefits And Possible Side Effects Of Medications:  {PSYCH RISK, BENEFITS AND POSSIBLE SIDE EFFECTS discussed    Controlled Medication Discussion: No records found for controlled prescriptions according to Josefina Chase 17       Psychotherapy Provided: Individual psychotherapy provided  Goals discussed in session: self hatred, intrusive thoughts, loving herself, self reliance  Counseling provided: 16    This note was not shared with the patient due to reasonable likelihood of causing patient harm        I spent 25 minutes with patient today in which greater than 50% of the time was spent in counseling/coordination of care regarding treatment    [facility]    [facility] verbally agrees to participate in [employer]  Pt is aware that [employer] could be limited without vital signs or the ability to perform a full hands-on physical [facility] understands she or the provider may request at any time to terminate the video visit and request the patient to seek care or treatment in person

## 2022-03-04 DIAGNOSIS — Z30.41 ENCOUNTER FOR BIRTH CONTROL PILLS MAINTENANCE: ICD-10-CM

## 2022-03-04 RX ORDER — NORGESTIMATE AND ETHINYL ESTRADIOL 7DAYSX3 28
1 KIT ORAL DAILY
Qty: 84 TABLET | Refills: 0 | Status: SHIPPED | OUTPATIENT
Start: 2022-03-04 | End: 2022-05-27 | Stop reason: SDUPTHER

## 2022-03-04 NOTE — TELEPHONE ENCOUNTER
Patient calling in regards to needing birth control refilled  Patient is transferring gyn offices  She is not going to be seen by her new office until May  Please advise

## 2022-03-07 NOTE — TELEPHONE ENCOUNTER
Patient wanted to switch pharmacies  Discussed with patient she will keep medication refill where it was sent

## 2022-04-07 ENCOUNTER — TELEPHONE (OUTPATIENT)
Dept: PSYCHIATRY | Facility: CLINIC | Age: 22
End: 2022-04-07

## 2022-04-27 DIAGNOSIS — F33.2 MAJOR DEPRESSIVE DISORDER, RECURRENT SEVERE WITHOUT PSYCHOTIC FEATURES (HCC): ICD-10-CM

## 2022-04-27 DIAGNOSIS — F42.2 MIXED OBSESSIONAL THOUGHTS AND ACTS: ICD-10-CM

## 2022-04-27 DIAGNOSIS — F41.1 GENERALIZED ANXIETY DISORDER: ICD-10-CM

## 2022-04-27 RX ORDER — SERTRALINE HYDROCHLORIDE 100 MG/1
100 TABLET, FILM COATED ORAL DAILY
Qty: 90 TABLET | Refills: 2 | Status: SHIPPED | OUTPATIENT
Start: 2022-04-27

## 2022-04-27 NOTE — TELEPHONE ENCOUNTER
Former patient of Dr Jovan Louise left a message for a refill of Zoloft  She currently does not have an appointment as Dr Jovan Louise has left

## 2022-04-27 NOTE — TELEPHONE ENCOUNTER
This writer spoke with the patient and scheduled her with Loly Goodwin  Please advise medication refill request  Thank you!

## 2022-05-25 ENCOUNTER — OFFICE VISIT (OUTPATIENT)
Dept: PSYCHIATRY | Facility: CLINIC | Age: 22
End: 2022-05-25

## 2022-05-25 VITALS — WEIGHT: 130 LBS | HEIGHT: 67 IN | BODY MASS INDEX: 20.4 KG/M2

## 2022-05-25 DIAGNOSIS — F33.0 MAJOR DEPRESSIVE DISORDER, RECURRENT, MILD (HCC): ICD-10-CM

## 2022-05-25 DIAGNOSIS — F42.2 MIXED OBSESSIONAL THOUGHTS AND ACTS: Primary | ICD-10-CM

## 2022-05-25 DIAGNOSIS — F41.1 GENERALIZED ANXIETY DISORDER: ICD-10-CM

## 2022-05-25 PROCEDURE — 96127 BRIEF EMOTIONAL/BEHAV ASSMT: CPT | Performed by: NURSE PRACTITIONER

## 2022-05-25 PROCEDURE — 90792 PSYCH DIAG EVAL W/MED SRVCS: CPT | Performed by: NURSE PRACTITIONER

## 2022-05-25 NOTE — BH TREATMENT PLAN
TREATMENT PLAN (Medication Management Only)        [facility]    Name and Date of Birth:  Marko Bowie 20 y o  2000  Date of Treatment Plan: May 25, 2022  Diagnosis/Diagnoses:    1  Mixed obsessional thoughts and acts    2  Generalized anxiety disorder    3  Major depressive disorder, recurrent, mild (HCC)      Strengths/Personal Resources for Self-Care: supportive family, supportive friends, taking medications as prescribed, ability to communicate needs, ability to communicate well, ability to listen, ability to reason, ability to understand psychiatric illness, average or above intelligence, family ties, financial means, financial security, general fund of knowledge, good physical health, good understanding of illness, independence, motivation for treatment, ability to negotiate basic needs, being resoureceful, self-reliance, sense of humor, special hobby/interest, well educated, willingness to work on problems, work skills  Area/Areas of need (in own words): anxiety symptoms  1  Long Term Goal: improve control of obsessive thoughts  Target Date:6 months - 11/25/2022  Person/Persons responsible for completion of goal: Christie  2  Short Term Objective (s) - How will we reach this goal?:   A  Provider new recommended medication/dosage changes and/or continue medication(s): continue current medications as prescribed Zoloft  B  Think positively  C  Try not to isoalte self  Target Date:6 months - 11/25/2022  Person/Persons Responsible for Completion of Goal: Christie  Progress Towards Goals: continuing treatment  Treatment Modality: medication management every 4 weeks  Review due 180 days from date of this plan: 6 months - 11/25/2022  Expected length of service: ongoing treatment  My Physician/PA/NP and I have developed this plan together and I agree to work on the goals and objectives   I understand the treatment goals that were developed for my treatment

## 2022-05-25 NOTE — PSYCH
[address]    Name and Date of Birth:  Nora Banda 20 y o  2000 MRN: [id number]    Date of Visit: May 25, 2022    Reason for visit: Full psychiatric intake assessment for medication management        Chief Complaint   Patient presents with   Jessie Padilla Establish Care    Medication Management    OCD    Depression       HPI:     Nora Banda is a 20 y o   female, Single (never ), domiciled with boyfriend and father, currently employed, w/no significant PMH and PPH of MDD, J CARLOS, OCD, no prior psychiatric admissions, no prior SA, positive h/o self-injurious behavior (hit self),  who presented to the mental health clinic for the initial intake and psychiatric evaluation on May 25, 2022  Kris May was a former patient of Dr Olamide Ji (last visit on 8/27/21) on Zoloft 100mg QD  Tolerating medication well with no medication side effects observed or reported  Actively involved in individual psychotherapy with Rebeca Lo, but searching for another therapist who specializes in OCD  Nora Banda was visited in the clinic; chart reviewed  Presented restless, fidgety, cooperative and well related, casually dressed w/ good hygiene, good eye contact, wearing face mask, "good"mood, pleasant affect, talking in normal tone, volume and amount, w/ linear thought process, fair insight and judgement  Reports first meeting with psychiatrist at age 24 due to heightened symptoms and emotional breakdowns at home  Precipitating stressors included pandemic, aunt's death, and social isolation  However, states that symptoms of anxiety/depression/ocd started at age 7  Would tap on a tooth brush, click light switches, and check door knobs  Intrusive thoughts came later and presented with negative feelings towards self and would punish self with hitting herself in the wrist/arm with other hand     Was also bullied in school secondary to shyness and struggles to make friends  Mother became her only friend at that time, and therefore boundaries were difficult to maintain  Would rely on mother with OCD and reassurance  Over the past few years, they agreed to "love from [place]"  Mother continues to worry at times, but both are respectful of boundaries  Was initiated on Zoloft by Dr Caroline Howe and titrated to 100 mg daily  Attempted to increase to 50 mg, but felt more anxious at that dose and decreased back to 100 mg QD (current dose)  Is now able to process feelings without needing as much reassurance  Still working on not hating self as much  No self harm with last episode occurring in December 2021  Took one week off of Zoloft due to lack of medication recently and symptoms resumed  Crying, ballesteros, anxious, lashing out, irritable  Symptoms dissipated once medications were resumed  Endorses acute and chronic anxiety, pathologic in nature, and suggestive of J CARLOS (generalized anxiety disorder)  Reports excessive nervousness, irrational worry, and overt anxiousness often rooted in intrusive thoughts, but spiraling into worry about "everything"  Currently feels that anxiety is heightened secondary to recently graduating and uncertainty of what employment will look like in her future  Finds herself comparing self to peers with their accomplishments, internships, job opportunities  She is pervasively restless, tense, keyed-up, and chronically on-edge  Experiences disruption in energy and concentration secondary to anxiety  Is somewhat irritable and at times has difficulty with relaxing  Denies disruption in sleep  Often, overwhelmed/consumed by irrational fear  Denies new-onset panic symptomatology or maladaptive behaviors  last panic attack was approximately a year ago  No identifiable trigger  Throughout today's session, Trey Sharma appears visibly unsettled  J CARLOS-7 score 17      Prior to initiaoin of medications, would find herself with low mood, isolate and withdraw from others, with difficulty sleeping, focusing, and anhedonia  Currently denies sxs suggestive of major depressive disorder or dysthymia  Denies disrupted/non-restorative sleep  Typically goes to be around 2 AM and wakes at 8 AM   Sleeps on average 6 hours/night  Endorses healthy appetite (lost 15 lbs over the last few months with healthy eating and working out), baseline energy, and no impairment of motivation  Reports adequate concentration/memory and denies new-onset forgetfulness/inattentiveness  Denies daily crying spells or anhedonia  Enjoys video games, drawing, singing, dancing, creative writing, long walks  Adamantly denies acute thoughts of suicide or self-harm and has no plans to harm others  No documented history of prior suicidal gestures or suicidal attempts  Denies historical non-suicidal self injurious behavior  Ricky Randhawa is future-oriented and demonstrates self preservation as evidenced by today's evaluation in which Ricky Randhawa is seeking psychiatric intervention to improve overall mental health and outlook on life  Denies a history of worthlessness, hopelessness, or guilt  PHQ-9 score 9  Currently involved in first romantic relationship  Relationship is supportive, but is having difficulty managing OCD symtpoms and expalining it to somoene who does not have an understanding of mental illness  She is mostly dealing with intrusive thoughts  Worrries that she says bad words or racial slurs, jealousy, negative self-talk, skewing memories  Worries that she could be a pedophile  Then needs reassurance that she did not say anything  Also is starting to attribute negative thoughts to boydelta  Had worried that he was a pedophile in the past   Is aware that these thoughts are untrue  There are several times during our conversation she asked for reassurance that she did not say anything, in an anxious, joking manor    This writer would not give her reassurance, asking her to trust her "gut" and sit in the uncomfortable spot of not having reassurance, knowing it will likely pass soon  Patient reacted appropriately, with understanding, each time  Checking behaviors remain, although not as bothersome  States that she is not in her own home, therefore she does not assign herself the responsibility of the checking behaviors (locking doors, stove checking)  Coping mechanisms for intrusive thoughts include: conversations with self rationalizing why thoughts are not true, distraction, mindless TV  Also will type "intrusive thought" with the content of the thought in her phone and then delete  Endorses history of disordered eating with periods of extreem caolorie restriction (freshman/carol year of college) less then 1200 calories/day  Felt that she always needed to be the "skinny" kid  Has many self-image issues  Does not like face, and looks for reassurance that she's not ugly  Compare self to others  Mentally not good enough  Engages in skin picking, biting cheeks often  Fidgets often  Will explore more in future to determine if this is rooted in obsessional thoughts, body dysmorphia, or disordered eating  Denies history of trauma with no intrusive, avoidance, negative alterations, or hyperarousal symptoms of PTSD noted  No current or history of manic sxs  Denied A/VH  No paranoid ideations or fixed delusions were elicited  Does not appear internally preoccupied at time of encounter  Vehemently denied SI/HI, intent or plan upon direct inquiry at this time  Denied smoking cigarettes  Eat Delta 8 (cannabis) gummies (weekends), drinks once monthly 4 drinks/night  Brother with suicidal thoughts, depression  Sister with suicidal thoughts, borderline personality disorder, with ADD  Multiple cousins with depression, BPAD  Two cousins attempted suicide (one with gun, other suffocation)  Father with anxiety and panic attacks  Mother with PTSD        Review Of Systems:    Constitutional negative   ENT negative   Cardiovascular negative   Respiratory negative   Gastrointestinal negative   Genitourinary negative   Musculoskeletal negative   Integumentary negative   Neurological negative   Endocrine negative   Other Symptoms none, all other systems are negative         PHQ-2/9 Depression Screening    Little interest or pleasure in doing things: 0 - not at all  Feeling down, depressed, or hopeless: 2 - more than half the days  Trouble falling or staying asleep, or sleeping too much: 2 - more than half the days  Feeling tired or having little energy: 2 - more than half the days  Poor appetite or overeatin - not at all  Feeling bad about yourself - or that you are a failure or have let yourself or your family down: 1 - several days  Trouble concentrating on things, such as reading the newspaper or watching television: 0 - not at all  Moving or speaking so slowly that other people could have noticed  Or the opposite - being so fidgety or restless that you have been moving around a lot more than usual: 2 - more than half the days  Thoughts that you would be better off dead, or of hurting yourself in some way: 0 - not at all  PHQ-9 Score: 9   PHQ-9 Interpretation: Mild depression          J CARLOS-7 Flowsheet Screening    Flowsheet Row Most Recent Value   Over the last 2 weeks, how often have you been bothered by any of the following problems? Feeling nervous, anxious, or on edge 1   Not being able to stop or control worrying 3   Worrying too much about different things 3   Trouble relaxing 3   Being so restless that it is hard to sit still 3   Becoming easily annoyed or irritable 1   Feeling afraid as if something awful might happen 3   J CARLOS-7 Total Score 17            Past Psychiatric History:  Italicized information copied from Dr Kiran Garcia note 3/23/21  New information bolded       Past Inpatient Psychiatric Treatment:   In Patient: denied   Past Outpatient Psychiatric Treatment:    individual therapy to address symptoms  She sees Dipak Shailesh  Past Suicide Attempts:               no  Past Violent Behavior:               yes, she has hit her wrist with her other hand when she feels she needs a punishment or feels guilty  Past Psychiatric Medication Trials:    Zoloft                Traumatic History: Italicized information copied from Dr Peter Green note 3/23/21  New information bolded  Abuse: emotional: mom  Other Traumatic Events: bullied in school    Family Psychiatric History:     Family History   Problem Relation Age of Onset    Lung disease Mother     Anxiety disorder Mother     Hyperlipidemia Father     Personality disorder Sister     Alcohol abuse Sister     Anxiety disorder Brother     Depression Brother     No Known Problems Maternal Grandmother     No Known Problems Maternal Grandfather     No Known Problems Paternal Grandmother     No Known Problems Paternal Grandfather     Bipolar disorder Maternal Aunt     Alcohol abuse Cousin     Suicide Attempts Cousin        Substance Abuse History:     Denied smoking cigarettes  Eat Delta 8 (cannabis) gummies (weekends), drinks once monthly 4 drinks/night  No past legal actions or arrests secondary to substance intoxication  The patient denies prior DWIs/DUIs  No history of outpatient/inpatient rehabilitation programs  Patrick Graf does not exhibit objective evidence of substance withdrawal during today's examination nor does Patrick Graf appear under the influence of any psychoactive substance  Social History: Italicized information copied from Dr Peter Green note 3/23/21  New information bolded  Born and raised: [facility] [place]  Parents   Has an older brother and sister  Education: student final year of college   she has anothe year to do for a grad progam  she attends [employer]  Learning Disabilities: none  Marital history: single, no children  Living arrangement, social support: The patient lives in a dorm alone  Support systems: sister, parents as well, but limited  cousins and friends   Family relationship issues: boundry issues with parents      Family financial problems: denied  Things the patient would change about the family include: wishes she had better boundaries with her mother and not so heavily reliant on her mother for support of her mental health  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  Functioning Relationships: good support system, gets along well with co-workers, good relationship with parents and sometimes feels alone and isolated    Other Pertinent History: None     Past Medical History:    Past Medical History:   Diagnosis Date    Anxiety     Depression     Fibrocystic breast     Obsessive-compulsive disorder     Panic attack      Past Medical History Pertinent Negatives:   Diagnosis Date Noted    Abnormal Pap smear of cervix 06/15/2018    BRCA positive 03/08/2019    Breast cancer ([place] [id number] ) 03/08/2019    Breast cyst 03/08/2019    Breast injury 03/08/2019    Colon cancer ([place] [address] ) 03/08/2019    Disease of thyroid gland 06/15/2018    Endometrial cancer ([place] [address] ) 03/08/2019    Endometriosis 06/15/2018    Female infertility 06/15/2018    Fibroid 06/15/2018    Gonorrhea 06/15/2018    Herpes 06/15/2018    History of chemotherapy 03/08/2019    History of radiation therapy 03/08/2019    HPV (human papilloma virus) infection 06/15/2018    Ovarian cancer ([place] [address] ) 03/08/2019    Polycystic ovary syndrome 06/15/2018    Recurrent pregnancy loss, antepartum condition or complication 42/77/8111    Syphilis 06/15/2018    Urinary tract infection 06/15/2018    Urogenital trichomoniasis 06/15/2018    Uterine cancer ([place] [address] ) 06/15/2018     Past Surgical History:   Procedure Laterality Date    EYE SURGERY      MOUTH SURGERY       Allergies   Allergen Reactions    Amoxicillin Hives    Cefprozil Hives    Ceftibuten     Diphenhydramine Hives    Penicillins        History Review:     The following portions of the patient's history were reviewed and updated as appropriate: allergies, current medications, past family history, past medical history, past social history, past surgical history and problem list     OBJECTIVE:    Vital signs in last 24 hours:    Vitals:    05/25/22 1158   Weight: 59 kg (130 lb)   Height: 5' 7 01" (1 702 m)       Mental Status Evaluation:  Appearance and attitude: appeared as stated age, cooperative and attentive, casually dressed, wearing a facemask, with good hygiene  Eye contact: good  Motor Function: PMA  Gait/station: normal gait/station and normal balance  Speech: normal for rate, rhythm, volume, latency, amount and over-inclusive  Language: No overt abnormality  Mood/affect: euthymic, more anxious / Affect was euthymic, reactive, in full range, normal intensity and mood congruent  Thought Processes: sequential and goal-directed, increased rate of thoughts  Thought content: denies suicidal ideation or homicidal ideation; no delusions or first rank symptoms  Associations: intact associations  Perceptual disturbances: denies Auditory/Visual/Tactile Hallucinations  Orientation: oriented to time, person, place and to the situational context  Cognitive Function: intact  Memory: recent and remote memory grossly intact  Intellect: average  Fund of knowledge: aware of current events, aware of past history and vocabulary average  Impulse control: fair  Insight/judgment: fair/good    Pain: denied    Lab Results: I have personally reviewed all pertinent laboratory/tests results  CBC:   Lab Results   Component Value Date    WBC 5 60 12/20/2019    RBC 4 73 12/20/2019    HGB 13 2 12/20/2019    HCT 40 0 12/20/2019    MCV 84 12/20/2019     12/20/2019    MCH 27 8 12/20/2019    MCHC 33 0 12/20/2019    RDW 14 9 12/20/2019    MPV 9 0 12/20/2019    NRBC 0 11/27/2019    NEUTROABS 3 00 12/20/2019     CMP:   Lab Results   Component Value Date    K 4 1 12/20/2019    CL 103 12/20/2019    CO2 25 12/20/2019    BUN 8 12/20/2019    CREATININE 0 63 12/20/2019    CALCIUM 9 6 12/20/2019    AST 30 12/20/2019    ALT 22 12/20/2019    ALKPHOS 76 12/20/2019    EGFR 130 12/20/2019       EKG and Other Studies:     Echo 2019: SUMMARY     LEFT VENTRICLE:  Systolic function was normal  Ejection fraction was estimated to be 60 %  There were no regional wall motion abnormalities      AORTIC VALVE:  The valve was trileaflet  Leaflets exhibited normal thickness and normal cuspal separation      TRICUSPID VALVE:  There was trace regurgitation      PULMONIC VALVE:  There was trace regurgitation        Suicide/Homicide Risk Assessment:    Risk of Harm to Self:  The following ratings are based on assessment at the time of the interview  Demographic risk factors include: , age: young adult (15-24)  Historical Risk Factors include: chronic psychiatric problems, history of depression, chronic anxiety symptoms  Recent Specific Risk Factors include: mental illness diagnosis, current anxiety symptoms  Protective Factors: no current suicidal ideation, able to manage anger well, access to mental health treatment, compliant with medications, compliant with mental health treatment, connection to community, contact with caregivers, effective coping skills, effective decision-making skills, effective problem solving skills, good health, good self-esteem, having a desire to be alive, having a desire to live, having a sense of purpose or meaning in life, healthy fear of risky behaviors and pain, medical compliance, opportunities to contribute to community, opportunities to participate in community, personal beliefs, personal beliefs about the meaning and value of life, Catholic beliefs discouraging suicide, resiliency, responsibilities and duties to others, stable living environment, sense of determination, sense of importance of health and wellness, sense of personal control, strong relationships, supportive family, supportive friends  Based on today's assessment, Stevie Tyler presents the following risk of harm to self: minimal    Risk of Harm to Others: The following ratings are based on assessment at the time of the interview  Demographic Risk Factors include: [age] years of age  Historical Risk Factors include: victim of childhood bullying  Recent Specific Risk Factors include: none  Protective Factors: no current homicidal ideation, ability to adapt to change, able to manage anger well, access to mental health treatment, compliant with medications, compliant with mental health treatment, connection to community, contact with caregivers, effective coping skills, effective decision-making skills, effective problem solving skills, medical compliance, moral system, no substance use problems, personal beliefs, resilience, responsibilities and duties to others, safe and stable living environment, sense of personal control, strong relationships, support system, supportive family, supportive friends  Based on today's assessment, Stevie Tyler presents the following risk of harm to others: minimal    The following interventions are recommended: no intervention changes needed  Although patient's acute lethality risk is LOW, long-term/chronic lethality risk is mildly elevated given chronic anxiety and OCD symptoms  However, at the current moment, Stevie Tyler is future-oriented, forward-thinking, and demonstrates ability to act in a self-preserving manner as evidenced by volitionally presenting to the clinic today, seeking treatment  Additionally, Stevie Tyler sits throughout the assessment wearing personal protective gear (ie mask) in the context of an ongoing viral pandemic, suggesting a will and desire to live  At this time, inpatient hospitalization is not currently warranted   To mitigate future risk, patient should adhere to treatment recommendations, avoid alcohol/illicit substance use, utilize community-based resources and familiar support, and prioritize mental health treatment  Based on today's assessment and clinical criteria, [employer] for safety and is not an imminent risk of harm to self or others  Outpatient level of care is deemed appropriate at this present time  Kristie Valdovinos understands that if they are no longer able to contract for safety, they need to call/contact the outpatient office including this writer, call/contact crisis and/or attend to the nearest Emergency Department for immediate evaluation  Assessment/Plan:   In summary, Denise Friedman is a 20 y o   female, Single (never ), domiciled with boyfriend and father, currently employed, w/no significant PMH and PPH of MDD, J CARLOS, OCD, no prior psychiatric admissions, no prior SA, positive h/o self-injurious behavior (hit self),  who presented to the mental health clinic for the initial intake and psychiatric evaluation on May 25, 2022  Kristie Valdovinos was a former patient of Dr Doron Potter (last visit on 8/27/21) on Zoloft 100mg QD  Denise Friedman was visited in the clinic; chart reviewed  Presented restless, fidgety, cooperative and well related, casually dressed w/ good hygiene, good eye contact, wearing face mask, "good"mood, pleasant affect, talking in normal tone, volume and amount, w/ linear thought process, fair insight and judgement  Reports first meeting with psychiatrist at age 24 due to heightened symptoms and emotional breakdowns at home  Precipitating stressors included pandemic, aunt's death, and social isolation  However, states that symptoms of anxiety/depression/ocd started at age 7  Would tap on a tooth brush, click light switches, and check door knobs  Intrusive thoughts came later and presented with negative feelings towards self and would punish self with hitting herself in the wrist/arm with other hand  Was also bullied in school secondary to shyness and struggles to make friends    Mother became her only friend at that time, and therefore boundaries were difficult to maintain  Would rely on mother with OCD and reassurance  Mother continues to worry at times, but both are respectful of boundaries  Was initiated on Zoloft by Dr Shawna Jean and titrated to 100 mg daily  Attempted to increase to 50 mg, but felt more anxious at that dose and decreased back to 100 mg QD (current dose)  Is now able to process feelings without needing as much reassurance  Still working on not hating self as much  No self harm with last episode occurring in December 2021  Took one week off of Zoloft due to lack of medication recently and symptoms resumed  Crying, ballesteros, anxious, lashing out, irritable  Symptoms dissipated once medications were resumed  Continues to struggle with intrusive thoughts, mostly  At times attributes thoughts to boyfriend  Thoughts mostly have to do with "being a bad person, saying racial slurs"  PHQ-9 score: 9; J CARLOS-7 score: 17   Her current presentation meets criteria for MDD, J CARLOS, OCD R/O body dysmorphia  Currently she is not at risk for suicide, homicide, self-injury, aggressive behaviors, self-neglect, or neglect of dependents or children  Given this presentation, the patient will benefit from further outpatient follow up for management of her symptoms  At conclusion of evaluation, Felix Coy is amenable and gave informed consent to the recommendations of this writer including: Further optomize dose of Zoloft from 100 mg QD to 125 mg QD  In past, increased dose to 150 mg QD led to increased anxiety, however it is possible that she needed a slower titration  Will increase by 25 mg/month and re-evaluate in 1 month  Lexis Vasquez understanding that anxiety may occur initially as body adjusts to new dose, but this should dissipate over time  Psycho-education regarding antidepressant medication class, and the importance of compliance with psychiatric treatment reiterated    PARQ completed including serotonin syndrome, SIADH, worsening depression, suicidality, induction of halima, GI upset, headaches, activation, sexual side effects, sedation, potential drug interactions, and others  Educated on the [address]  and Environmental Approach to mental health  Patient was receptive to education  Diagnoses and all orders for this visit:    Mixed obsessional thoughts and acts  -     sertraline (Zoloft) 50 mg tablet; Take 0 5 tablets (25 mg total) by mouth daily In addition to 100 mg tablet for a total of 125 mg daily  Generalized anxiety disorder  -     sertraline (Zoloft) 50 mg tablet; Take 0 5 tablets (25 mg total) by mouth daily In addition to 100 mg tablet for a total of 125 mg daily  Major depressive disorder, recurrent, mild (HCC)  -     sertraline (Zoloft) 50 mg tablet; Take 0 5 tablets (25 mg total) by mouth daily In addition to 100 mg tablet for a total of 125 mg daily         - Psychoeducation provided regarding the importance of exercise and health dietary choices and their impact on mood, energy, and motivation   - Counseled to avoid ETOH, illicit substances, and nicotine secondary to the detrimental effects of these substances on mental and physical health  - Encouraged to engage in non-verbal forms of therapy such as art therapy, music therapy, and mindfulness  - Psychoeducation regarding medication benefits and risks, side effects, indications and alternatives provided to the patient and the importance of compliance with psychiatric medication reiterated   The [facility] verbalized understanding and agreed with the plan  - Patient is searching for new therapist specializing in [address] [place] [address] in 4 weeks, virtually  - The patient was educated about 24 hour and weekend coverage for urgent situations accessed by calling [facility] main practice number  - Patient was educated to call [address] [facility] ([phone]-[facility] Christine Mueller) for behavioral crisis at any time, 911 for any safety concerns, or go to nearest ER if her symptoms become overwhelming or unmanageable  Medications Risks/Benefits:      Risks, Benefits And Possible Side Effects Of Medications:    Risks, benefits, and possible side effects of medications explained to Delio Mcneil including risk of suicidality and serotonin syndrome related to treatment with antidepressants  She verbalizes understanding and agreement for treatment  Controlled Medication Discussion:     Not applicable    Treatment Plan:    Completed and signed during the session: Yes - Treatment Plan done but not signed at time of office visit due to:  Plan reviewed in person and verbal consent given due to Lillie social distancing    Note Share Disclaimer:      This note was not shared with the patient due to reasonable likelihood of causing patient harm      Davey Ma, [facility] 05/25/22

## 2022-05-27 ENCOUNTER — OFFICE VISIT (OUTPATIENT)
Dept: OBGYN CLINIC | Facility: CLINIC | Age: 22
End: 2022-05-27
Payer: COMMERCIAL

## 2022-05-27 VITALS
WEIGHT: 131.4 LBS | DIASTOLIC BLOOD PRESSURE: 62 MMHG | HEIGHT: 64 IN | SYSTOLIC BLOOD PRESSURE: 100 MMHG | BODY MASS INDEX: 22.43 KG/M2

## 2022-05-27 DIAGNOSIS — Z01.419 ENCOUNTER FOR ANNUAL ROUTINE GYNECOLOGICAL EXAMINATION: ICD-10-CM

## 2022-05-27 DIAGNOSIS — Z30.41 ENCOUNTER FOR BIRTH CONTROL PILLS MAINTENANCE: ICD-10-CM

## 2022-05-27 DIAGNOSIS — Z11.3 SCREEN FOR STD (SEXUALLY TRANSMITTED DISEASE): Primary | ICD-10-CM

## 2022-05-27 PROCEDURE — 87591 N.GONORRHOEAE DNA AMP PROB: CPT | Performed by: OBSTETRICS & GYNECOLOGY

## 2022-05-27 PROCEDURE — 87491 CHLMYD TRACH DNA AMP PROBE: CPT | Performed by: OBSTETRICS & GYNECOLOGY

## 2022-05-27 PROCEDURE — 99395 PREV VISIT EST AGE 18-39: CPT | Performed by: OBSTETRICS & GYNECOLOGY

## 2022-05-27 RX ORDER — NORGESTIMATE AND ETHINYL ESTRADIOL 7DAYSX3 28
1 KIT ORAL DAILY
Qty: 84 TABLET | Refills: 4 | Status: SHIPPED | OUTPATIENT
Start: 2022-05-27 | End: 2022-08-08 | Stop reason: SDUPTHER

## 2022-05-31 LAB
C TRACH DNA SPEC QL NAA+PROBE: NEGATIVE
N GONORRHOEA DNA SPEC QL NAA+PROBE: NEGATIVE

## 2022-06-10 NOTE — PROGRESS NOTES
Assessment/Plan:    pap is up to date    Chlamydia and gonorrhea done    Contraception - Tri sprintec renewed  Discussed self breast exams      discussed preventive care, regular exercise and a healthy diet      No problem-specific Assessment & Plan notes found for this encounter  Diagnoses and all orders for this visit:    Encounter for annual routine gynecological examination    Encounter for birth control pills maintenance  -     norgestimate-ethinyl estradiol (Tri-Sprintec) 0 18/0 215/0 25 MG-35 MCG per tablet; Take 1 tablet by mouth daily          Subjective:      Patient ID: Anali Akhtar is a 20 y o  female  New Pt - Pt here for yearly  She is on Tri sprintec  This was started for irregular menses  Inconsistent with condom use  No new partner    Normal pap in     No significant pmh, h/o HA, no migraine with aura  PSH includes eye surgery and wisdom teeth  zoloft for OCD, anxiety and depression      The following portions of the patient's history were reviewed and updated as appropriate: allergies, current medications, past family history, past medical history, past social history, past surgical history and problem list     Review of Systems   Constitutional: Negative  Gastrointestinal: Negative  Genitourinary: Negative  Objective: There were no vitals taken for this visit  Physical Exam  Vitals reviewed  Constitutional:       Appearance: She is well-developed  Neck:      Thyroid: No thyromegaly  Trachea: No tracheal deviation  Cardiovascular:      Rate and Rhythm: Normal rate and regular rhythm  Pulmonary:      Effort: Pulmonary effort is normal       Breath sounds: Normal breath sounds  Chest:   Breasts: Breasts are symmetrical       Right: No inverted nipple, mass, nipple discharge, skin change or tenderness  Left: No inverted nipple, mass, nipple discharge, skin change or tenderness         Abdominal:      General: There is no Spoke with DAISY who is still awaiting response regarding readmission to [facility]. Per her request, sent snf referral to [facility] through [facility]. Left VM for Paty at [facility] for information on where to send referral since they are not on [employer] at this time. Awaiting response.    Faxed referral to Paty at [facility]. Awaiting response.   distension  Palpations: Abdomen is soft  There is no mass  Tenderness: There is no abdominal tenderness  Genitourinary:     Labia:         Right: No rash, tenderness, lesion or injury  Left: No rash, tenderness, lesion or injury  Vagina: Normal       Cervix: No cervical motion tenderness, discharge or friability  Adnexa:         Right: No mass, tenderness or fullness  Left: No mass, tenderness or fullness
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
## 2022-06-22 ENCOUNTER — TELEMEDICINE (OUTPATIENT)
Dept: PSYCHIATRY | Facility: CLINIC | Age: 22
End: 2022-06-22

## 2022-06-22 DIAGNOSIS — F33.0 MAJOR DEPRESSIVE DISORDER, RECURRENT, MILD (HCC): ICD-10-CM

## 2022-06-22 DIAGNOSIS — F41.1 GENERALIZED ANXIETY DISORDER: ICD-10-CM

## 2022-06-22 DIAGNOSIS — F42.2 MIXED OBSESSIONAL THOUGHTS AND ACTS: Primary | ICD-10-CM

## 2022-06-22 PROCEDURE — 99214 OFFICE O/P EST MOD 30 MIN: CPT | Performed by: NURSE PRACTITIONER

## 2022-06-22 PROCEDURE — 90833 PSYTX W PT W E/M 30 MIN: CPT | Performed by: NURSE PRACTITIONER
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
## 2022-08-08 ENCOUNTER — TELEMEDICINE (OUTPATIENT)
Dept: PSYCHIATRY | Facility: CLINIC | Age: 22
End: 2022-08-08

## 2022-08-08 DIAGNOSIS — F41.1 GENERALIZED ANXIETY DISORDER: ICD-10-CM

## 2022-08-08 DIAGNOSIS — F42.2 MIXED OBSESSIONAL THOUGHTS AND ACTS: Primary | ICD-10-CM

## 2022-08-08 DIAGNOSIS — F33.0 MAJOR DEPRESSIVE DISORDER, RECURRENT, MILD (HCC): ICD-10-CM

## 2022-08-08 DIAGNOSIS — Z30.41 ENCOUNTER FOR BIRTH CONTROL PILLS MAINTENANCE: ICD-10-CM

## 2022-08-08 PROCEDURE — 99214 OFFICE O/P EST MOD 30 MIN: CPT | Performed by: NURSE PRACTITIONER

## 2022-08-08 PROCEDURE — 90833 PSYTX W PT W E/M 30 MIN: CPT | Performed by: NURSE PRACTITIONER

## 2022-08-08 RX ORDER — NORGESTIMATE AND ETHINYL ESTRADIOL 7DAYSX3 28
KIT ORAL
Qty: 84 TABLET | Refills: 0 | Status: SHIPPED | OUTPATIENT
Start: 2022-08-08

## 2022-10-10 ENCOUNTER — TELEMEDICINE (OUTPATIENT)
Dept: PSYCHIATRY | Facility: CLINIC | Age: 22
End: 2022-10-10

## 2022-10-10 DIAGNOSIS — F41.1 GAD (GENERALIZED ANXIETY DISORDER): ICD-10-CM

## 2022-10-10 DIAGNOSIS — F42.2 MIXED OBSESSIONAL THOUGHTS AND ACTS: Primary | ICD-10-CM

## 2022-10-10 DIAGNOSIS — F33.41 MDD (MAJOR DEPRESSIVE DISORDER), RECURRENT, IN PARTIAL REMISSION (HCC): ICD-10-CM

## 2022-10-10 PROCEDURE — 99214 OFFICE O/P EST MOD 30 MIN: CPT | Performed by: NURSE PRACTITIONER

## 2022-10-10 PROCEDURE — 90833 PSYTX W PT W E/M 30 MIN: CPT | Performed by: NURSE PRACTITIONER
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
## 2022-11-18 DIAGNOSIS — Z30.41 ENCOUNTER FOR BIRTH CONTROL PILLS MAINTENANCE: ICD-10-CM

## 2022-11-18 RX ORDER — NORGESTIMATE AND ETHINYL ESTRADIOL 7DAYSX3 28
1 KIT ORAL DAILY
Qty: 84 TABLET | Refills: 0 | Status: SHIPPED | OUTPATIENT
Start: 2022-11-18

## 2022-12-13 DIAGNOSIS — Z30.41 ENCOUNTER FOR BIRTH CONTROL PILLS MAINTENANCE: ICD-10-CM

## 2022-12-13 RX ORDER — NORGESTIMATE AND ETHINYL ESTRADIOL 7DAYSX3 28
1 KIT ORAL DAILY
Qty: 84 TABLET | Refills: 1 | Status: SHIPPED | OUTPATIENT
Start: 2022-12-13

## 2022-12-13 NOTE — TELEPHONE ENCOUNTER
Patient's Yearly was rescheduled for 6/8/23  She needs BCP refills to last until then  Please forward attached escript

## 2023-01-06 ENCOUNTER — TELEPHONE (OUTPATIENT)
Dept: PSYCHIATRY | Facility: CLINIC | Age: 23
End: 2023-01-06

## 2023-01-06 NOTE — TELEPHONE ENCOUNTER
Left a message on 1/6/23 to remind patient that she had a virtual visit scheduled with Andrew Nguyen on 1/10/23  Also informed her that our records show she has no active insurance at this time  Suggested that she call the office with this information or stop in prior to appointment  If not the virtual visit would require a self pay estimate

## 2023-01-10 ENCOUNTER — TELEMEDICINE (OUTPATIENT)
Dept: PSYCHIATRY | Facility: CLINIC | Age: 23
End: 2023-01-10

## 2023-01-10 DIAGNOSIS — F42.2 MIXED OBSESSIONAL THOUGHTS AND ACTS: Primary | ICD-10-CM

## 2023-01-10 DIAGNOSIS — F33.0 MAJOR DEPRESSIVE DISORDER, RECURRENT, MILD (HCC): ICD-10-CM

## 2023-01-10 DIAGNOSIS — F41.1 GENERALIZED ANXIETY DISORDER: ICD-10-CM

## 2023-01-10 RX ORDER — SERTRALINE HYDROCHLORIDE 100 MG/1
100 TABLET, FILM COATED ORAL DAILY
Qty: 90 TABLET | Refills: 2 | Status: SHIPPED | OUTPATIENT
Start: 2023-01-10

## 2023-01-10 NOTE — BH TREATMENT PLAN
TREATMENT PLAN (Medication Management Only)        [facility]    Name and Date of Birth:  Chase Bautista 20 y o  2000  Date of Treatment Plan: January 10, 2023  Diagnosis/Diagnoses:    1  Mixed obsessional thoughts and acts    2  Generalized anxiety disorder    3  Major depressive disorder, recurrent, mild (HCC)      Strengths/Personal Resources for Self-Care: supportive family, supportive friends, taking medications as prescribed, ability to adapt to life changes, ability to communicate needs, ability to communicate well, ability to listen, ability to reason, ability to understand psychiatric illness, average or above intelligence, family ties, financial means, financial security, general fund of knowledge, good physical health, good understanding of illness, independence, motivation for treatment, ability to negotiate basic needs, being resoureceful, self-reliance, sense of humor, special hobby/interest, stable employment, well educated, willingness to work on problems, work skills  Area/Areas of need (in own words): anxiety, depression, obsessive-compulsive symptoms  1  Long Term Goal: continue improvement in obsessive thoughts  Target Date:6 months - 7/10/2023  Person/Persons responsible for completion of goal: Christie  2  Short Term Objective (s) - How will we reach this goal?:   A  Provider new recommended medication/dosage changes and/or continue medication(s): continue current medications as prescribed  B  N/A   C  N/A  Target Date:6 months - 7/10/2023  Person/Persons Responsible for Completion of Goal: Christie  Progress Towards Goals: continuing treatment  Treatment Modality: medication management every 3 months  Review due 180 days from date of this plan: 6 months - 7/10/2023  Expected length of service: ongoing treatment  My Physician/PA/NP and I have developed this plan together and I agree to work on the goals and objectives   I understand the treatment goals that were developed for my treatment
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
## 2023-02-14 ENCOUNTER — TELEPHONE (OUTPATIENT)
Dept: GYNECOLOGY | Facility: CLINIC | Age: 23
End: 2023-02-14

## 2023-02-14 NOTE — TELEPHONE ENCOUNTER
Patient left message requesting BCP refills  Did not answer return call  Left message to inform refills were sent on 12/13/22, 6 months worth    Advised check with pharmacist

## 2023-05-24 ENCOUNTER — TELEPHONE (OUTPATIENT)
Dept: PSYCHIATRY | Facility: CLINIC | Age: 23
End: 2023-05-24

## 2023-05-24 NOTE — TELEPHONE ENCOUNTER
Patient contacted the office requesting to refill her Zoloft 100 mg tablet and Zoloft 50 mg tablet  Writer informed patient that it look like she still had refills for those medications, contact the pharmacy to refill prescriptions

## 2023-06-06 NOTE — PROGRESS NOTES
Assessment/Plan:    pap is up to date, due to her heavy menses, will do her Pap next year    Discussed self breast exams    Contraception-she will continue with her Tri-Sprintec, prescription sent  discussed preventive care, regular exercise and a healthy diet      No problem-specific Assessment & Plan notes found for this encounter  Diagnoses and all orders for this visit:    Encounter for annual routine gynecological examination    Encounter for birth control pills maintenance  -     norgestimate-ethinyl estradiol (ORTHO TRI-CYCLEN,TRINESSA) 0 18/0 215/0 25 MG-35 MCG per tablet; Take 1 tablet by mouth daily          Subjective:      Patient ID: Shital Chen is a 24 y o  female  Patient here for yearly  She is on Tri-Sprintec for irregular cycles and contraception  This is working well for her  She has no complaints  She is sexually active and has had 1 partner  Her menstrual cycle started yesterday and is fairly heavy today  Normal Pap in December 2020  Negative chlamydia and gonorrhea in 2022      The following portions of the patient's history were reviewed and updated as appropriate: allergies, current medications, past family history, past medical history, past social history, past surgical history and problem list     Review of Systems   Constitutional: Negative  Gastrointestinal: Negative  Genitourinary: Negative  Objective: There were no vitals taken for this visit  Physical Exam  Vitals reviewed  Constitutional:       Appearance: She is well-developed  Neck:      Thyroid: No thyromegaly  Trachea: No tracheal deviation  Cardiovascular:      Rate and Rhythm: Normal rate and regular rhythm  Pulmonary:      Effort: Pulmonary effort is normal       Breath sounds: Normal breath sounds  Chest:   Breasts:     Breasts are symmetrical       Right: No inverted nipple, mass, nipple discharge, skin change or tenderness        Left: No inverted nipple, mass, nipple discharge, skin change or tenderness  Abdominal:      General: There is no distension  Palpations: Abdomen is soft  There is no mass  Tenderness: There is no abdominal tenderness  Genitourinary:     Labia:         Right: No rash, tenderness, lesion or injury  Left: No rash, tenderness, lesion or injury  Vagina: Normal       Uterus: Normal        Adnexa:         Right: No mass, tenderness or fullness  Left: No mass, tenderness or fullness          Comments: Bimanual only done

## 2023-06-08 ENCOUNTER — ANNUAL EXAM (OUTPATIENT)
Dept: GYNECOLOGY | Facility: CLINIC | Age: 23
End: 2023-06-08
Payer: COMMERCIAL

## 2023-06-08 VITALS
HEIGHT: 68 IN | BODY MASS INDEX: 21.98 KG/M2 | DIASTOLIC BLOOD PRESSURE: 62 MMHG | WEIGHT: 145 LBS | SYSTOLIC BLOOD PRESSURE: 112 MMHG

## 2023-06-08 DIAGNOSIS — Z30.41 ENCOUNTER FOR BIRTH CONTROL PILLS MAINTENANCE: ICD-10-CM

## 2023-06-08 DIAGNOSIS — Z01.419 ENCOUNTER FOR ANNUAL ROUTINE GYNECOLOGICAL EXAMINATION: Primary | ICD-10-CM

## 2023-06-08 PROCEDURE — S0612 ANNUAL GYNECOLOGICAL EXAMINA: HCPCS | Performed by: OBSTETRICS & GYNECOLOGY

## 2023-06-08 RX ORDER — NORGESTIMATE AND ETHINYL ESTRADIOL 7DAYSX3 28
1 KIT ORAL DAILY
Qty: 84 TABLET | Refills: 4 | Status: SHIPPED | OUTPATIENT
Start: 2023-06-08
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
## 2023-07-11 ENCOUNTER — TELEMEDICINE (OUTPATIENT)
Dept: PSYCHIATRY | Facility: CLINIC | Age: 23
End: 2023-07-11

## 2023-07-11 DIAGNOSIS — F33.0 MAJOR DEPRESSIVE DISORDER, RECURRENT, MILD (HCC): ICD-10-CM

## 2023-07-11 DIAGNOSIS — F41.1 GENERALIZED ANXIETY DISORDER: ICD-10-CM

## 2023-07-11 DIAGNOSIS — F42.2 MIXED OBSESSIONAL THOUGHTS AND ACTS: ICD-10-CM

## 2023-07-11 PROCEDURE — NC001 PR NO CHARGE: Performed by: NURSE PRACTITIONER

## 2023-10-24 ENCOUNTER — TELEPHONE (OUTPATIENT)
Dept: FAMILY MEDICINE CLINIC | Facility: CLINIC | Age: 23
End: 2023-10-24

## 2023-10-24 NOTE — PSYCH
Virtual Visit Disclaimer:       TeleMed provider: MARY Looney. Verification of patient location:     Patient is located at Home in the state of [place]. Patient is currently located in a state in which I am licensed     After connecting through [employer], the patient was identified by name and date of birth. Oleg Raines was informed that this is a telemedicine visit that is being conducted through [address] [place] [address] Now, and the patient was informed that this is a secure, HIPAA-compliant platform. My office door was closed. No one else was in the room. . Oleg Raines acknowledged consent and understanding of privacy and security of the video platform. [facility] understands that the online visit is based solely on information provided by the patient, and that, in the absence of a face-to-face physical evaluation by the provider, the diagnosis [facility]  receives is both limited and provisional in terms of accuracy and completeness. Oleg Raines understands that they can discontinue the visit at any time. I informed [facility] that I have reviewed their record in [facility] and presented the opportunity for them to ask any questions regarding the visit today. Oleg Raines voiced understanding and consented to these terms. [facility] is aware this is a billable service. Virtual visit start and stop times:    Start Time: 0932     Stop Time: 0947    I spent 15 minutes with patient today in which greater than 50% of the time was spent in counseling/coordination of care.       Anastasiya Kern, [address] [place] [address] 10/25/23    MEDICATION MANAGEMENT NOTE        [facility]      Name and Date of Birth:  Oleg Raines 23 y.o. 2000 MRN: [id number]    Date of Visit: October 25, 2023    Reason for Visit:   Chief Complaint   Patient presents with    Medication Management    Follow-up    Anxiety    Depression    OCD         SUBJECTIVE:    Oleg Raines is a 24 y.o. female with past psychiatric history significant for Major Depressive Disorder, Generalized Anxiety Disorder, OCD, and R/O body dysmorphia  who was virtually seen and evaluated today at the [address] [place] [address] outpatient clinic for follow-up and medication management. Completes psychiatric assessment without difficulty. Soco Berrios endorses compliance with psychotropic medication regimen that consists of Zoloft. At previous outpatient psychiatric appointment with this writer, no medication changes were made. She denies any current adverse medication side effects. Overall, Soco Berrios reports that anxiety and OCD symptoms have been exacerbated over the past several months. She has been spending an extensive amount of time working both during regular scheduled hours in addition to after hours work. This has left her little time for self-care. She has been performing more checking behaviors and needing reassurance from others. During times of exacerbated stress she has punished herself by isolation with 2 incidences of self-harm (slapping herself on the wrist and in the face). For the past 3 weeks she has engaged in "sober October" indicating that she has not been smoking marijuana. She has felt more motivated and less groggy during the daytime hours as a result. She is now felt it was a reset and is attempting to only smoke marijuana on the weekends moving forward. Sleep, appetite, and energy are baseline. She adamantly denies any thoughts of self-harm or suicidal ideation during time of encounter. She has not experienced any hypomanic/manic episodes. She is not currently irritable, grandiose, or pathologically euphoric. She denies perceptual disturbances and does not appear preoccupied at time of encounter. Discussed dose optimization of Zoloft to 175 mg daily to address OCD symptoms. Patient verbalizes understanding and agreement with plan. No other questions/concerns at this time.     Current Rating Scores:     None completed today. Past Psychiatric History: (unchanged information from previous note copied and italicized) - Information that is bolded has been updated. Past Inpatient Psychiatric Treatment:   In Patient: denied   Past Outpatient Psychiatric Treatment:    individual therapy to address symptoms. She sees [facility]  Past Suicide Attempts:               no  Past Violent Behavior:               yes, she has hit her wrist with her other hand when she feels she needs a punishment or feels guilty. Past Psychiatric Medication Trials:               Zoloft     Substance Abuse History: (unchanged information from previous note copied and italicized) - Information that is bolded has been updated. Denied smoking cigarettes. Eat Delta 8 (cannabis) gummies (weekends), drinks once monthly 4 drinks/night. No past legal actions or arrests secondary to substance intoxication. The patient denies prior DWIs/DUIs. No history of outpatient/inpatient rehabilitation programs. Earnest Cart does not exhibit objective evidence of substance withdrawal during today's examination nor does Earnest Cart appear under the influence of any psychoactive substance. Social History: (unchanged information from previous note copied and italicized) - Information that is bolded has been updated. Born and raised: [place], [place]. Parents . Has an older brother and sister. Education: student final year of college. she has anothe year to do for a grad progam. she attends [employer]  Learning Disabilities: none  Marital history: single, no children  Living arrangement, social support: The patient lives in a dorm alone. Support systems: sister, parents as well, but limited. cousins and friends   Family relationship issues: boundry issues with parents. .   Family financial problems: denied.    Things the patient would change about the family include: wishes she had better boundaries with her mother and not so heavily reliant on her mother for support of her mental health. 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 Other Pertinent History: None      Traumatic History: (unchanged information from previous note copied and italicized) - Information that is bolded has been updated. Abuse: emotional: mom  Other Traumatic Events: bullied in school      Past Medical History:    Past Medical History:   Diagnosis Date    Anxiety     Depression     Fibrocystic breast     Obsessive-compulsive disorder     Panic attack         Past Surgical History:   Procedure Laterality Date    EYE SURGERY      MOUTH SURGERY       Allergies   Allergen Reactions    Amoxicillin Hives    Cefprozil Hives    Ceftibuten     Diphenhydramine Hives    Penicillins        Substance Abuse History:    Social History     Substance and Sexual Activity   Alcohol Use Yes    Alcohol/week: 1.0 standard drink of alcohol    Types: 1 Standard drinks or equivalent per week    Comment: occ     Social History     Substance and Sexual Activity   Drug Use Yes    Types: Marijuana    Comment: occasional use       Social History:    Social History     Socioeconomic History    Marital status: Single     Spouse name: Not on file    Number of children: 0    Years of education: 16    Highest education level: Bachelor's degree (e.g., BA, AB, BS)   Occupational History    Occupation: student     Comment: [employer]   Tobacco Use    Smoking status: Light Smoker    Smokeless tobacco: Never   Vaping Use    Vaping Use: Never used   Substance and Sexual Activity    Alcohol use:  Yes     Alcohol/week: 1.0 standard drink of alcohol     Types: 1 Standard drinks or equivalent per week     Comment: occ    Drug use: Yes     Types: Marijuana     Comment: occasional use    Sexual activity: Yes     Partners: Male     Birth control/protection: OCP Comment: uses birth control   Other Topics Concern    Not on file   Social History Narrative    No caffeine use     Social Determinants of Health     Financial Resource Strain: Low Risk  (3/23/2021)    Overall Financial Resource Strain (CARDIA)     Difficulty of Paying Living Expenses: Not hard at all   Food Insecurity: No Food Insecurity (3/23/2021)    Hunger Vital Sign     Worried About Running Out of Food in the Last Year: Never true     Ran Out of Food in the Last Year: Never true   Transportation Needs: No Transportation Needs (3/23/2021)    PRAPARE - Transportation     Lack of Transportation (Medical): No     Lack of Transportation (Non-Medical):  No   Physical Activity: Insufficiently Active (3/23/2021)    Exercise Vital Sign     Days of Exercise per Week: 2 days     Minutes of Exercise per Session: 60 min   Stress: Stress Concern Present (3/23/2021)    [facility]     Feeling of Stress : Very much   Social Connections: Socially Isolated (3/23/2021)    Social Connection and Isolation Panel [NHANES]     Frequency of Communication with Friends and Family: More than three times a week     Frequency of Social Gatherings with Friends and Family: More than three times a week     Attends Taoist Services: Never     Active Member of Clubs or Organizations: No     Attends Club or Organization Meetings: Never     Marital Status: Never    Intimate Partner Violence: Not At Risk (3/23/2021)    Humiliation, Afraid, Rape, and Kick questionnaire     Fear of Current or Ex-Partner: No     Emotionally Abused: No     Physically Abused: No     Sexually Abused: No   Housing Stability: Not on file       Family Psychiatric History:     Family History   Problem Relation Age of Onset    Lung disease Mother     Anxiety disorder Mother     Rashes / Skin problems Mother     Hyperlipidemia Father     Heart disease Father     Osteoarthritis Father     Rashes / Skin problems Father     Personality disorder Sister     Alcohol abuse Sister     Anxiety disorder Brother     Depression Brother     Heart attack Maternal Grandmother     Osteoarthritis Maternal Grandmother     Rashes / Skin problems Maternal Grandmother         Rosacea    Heart attack Maternal Grandfather     Heart disease Maternal Grandfather     Osteoarthritis Paternal Grandmother     Rashes / Skin problems Paternal Grandmother     Cancer Paternal Grandfather     Heart disease Paternal Grandfather     Bipolar disorder Maternal Aunt     Alcohol abuse Cousin     Suicide Attempts Cousin     Migraines Brother     Rashes / Skin problems Brother         Pressure hives       History Review: The following portions of the patient's history were reviewed and updated as appropriate: allergies, current medications, past medical history, and past social history. OBJECTIVE:     Vital signs in last 24 hours: There were no vitals filed for this visit.     Mental Status Evaluation:    Appearance age appropriate, casually dressed   Behavior cooperative, calm   Speech normal rate, normal volume, normal pitch   Mood anxious, at times depressed   Affect normal range and intensity, appropriate   Thought Processes organized, goal directed   Associations intact associations   Thought Content no overt delusions   Perceptual Disturbances: no auditory hallucinations, no visual hallucinations   Abnormal Thoughts  Risk Potential Suicidal ideation - None  Homicidal ideation - None  Potential for aggression - No   Orientation oriented to: person, place, time/date, and situation   Memory recent and remote memory grossly intact   Consciousness alert and awake   Attention Span Concentration Span attention span and concentration are age appropriate   Intellect appears to be of average intelligence   Insight fair   Judgement fair   Muscle Strength and  Gait unable to assess today due to virtual visit   Motor activity no abnormal movements Language no difficulty naming common objects, no difficulty repeating a phrase   Fund of Knowledge adequate knowledge of current events  adequate fund of knowledge regarding past history  adequate fund of knowledge regarding vocabulary    Pain none   Pain Scale 0       Laboratory Results: I have personally reviewed all pertinent laboratory/tests results    Recent Labs (last 2 months):   No visits with results within 2 Month(s) from this visit. Latest known visit with results is:   Office Visit on 05/27/2022   Component Date Value    N gonorrhoeae, DNA Probe 05/27/2022 Negative     Chlamydia trachomatis, D* 05/27/2022 Negative        Lethality Statement:    Based on today's assessment and clinical criteria, [employer] for safety and is not an imminent risk of harm to self or others. Outpatient level of care is deemed appropriate at this current time. Neli Benson understands that if they can no longer contract for safety, they need to call the office or report to their nearest Emergency Room for immediate evaluation. Assessment/Plan:     In summary, Genevieve Woodall is a 20 y.o.  female, Single (never ), domiciled with boyfriend and father, currently employed, w/no significant PMH and PPH of MDD, J CARLOS, OCD, no prior psychiatric admissions, no prior SA, positive h/o self-injurious behavior (hit self),  who presented to the mental health clinic for the initial intake and psychiatric evaluation on May 25, 2022. Neli Benson was a former patient of Dr. Wilbert Matthew (last visit on 8/27/21) on Zoloft 100mg QD. Genevieve Woodall was visited in the clinic; chart reviewed. Presented restless, fidgety, cooperative and well related, casually dressed w/ good hygiene, good eye contact, wearing face mask, "good"mood, pleasant affect, talking in normal tone, volume and amount, w/ linear thought process, fair insight and judgement.  Reports first meeting with psychiatrist at age 24 due to heightened symptoms and emotional breakdowns at home. Precipitating stressors included pandemic, aunt's death, and social isolation. However, states that symptoms of anxiety/depression/ocd started at age 7. Would tap on a tooth brush, click light switches, and check door knobs. Intrusive thoughts came later and presented with negative feelings towards self and would punish self with hitting herself in the wrist/arm with other hand. Was also bullied in school secondary to shyness and struggles to make friends. Mother became her only friend at that time, and therefore boundaries were difficult to maintain. Would rely on mother with OCD and reassurance. Mother continues to worry at times, but both are respectful of boundaries. Was initiated on Zoloft by Dr. Jose Mensah and titrated to 100 mg daily. Attempted to increase to 50 mg, but felt more anxious at that dose and decreased back to 100 mg QD (current dose). Is now able to process feelings without needing as much reassurance. Still working on not hating self as much. No self harm with last episode occurring in December 2021. Took one week off of Zoloft due to lack of medication recently and symptoms resumed. Crying, ballesteros, anxious, lashing out, irritable. Symptoms dissipated once medications were resumed. Continues to struggle with intrusive thoughts, mostly. At times attributes thoughts to boyfriend. Thoughts mostly have to do with "being a bad person, saying racial slurs". Her current presentation meets criteria for MDD, J CALROS, OCD R/O body dysmorphia. Zoloft optimized to 150 mg daily. Past PHQ-9 scores: 9,7,6,5  Past J CARLOS-7 scores: 17,7,8,7    Psychopharmacologically, patient reports an uptake in anxiety, depression, and OCD (checking behaviors, seeking reassurance) symptoms secondary to increased stressors from work. Has had 2 episodes of self-harm in the form of slapping her wrists and face.   Has cut down on marijuana usage significantly, now only engaging on the weekends. Agrees to dose optimization of Zoloft to 175 mg daily to address OCD symptoms. Risks/benefits/alternativies to treatment discussed, including a myriad of potential adverse medication side effects, to which [facility] voiced understanding and consented fully to treatment. Also, patient is amenable to calling/contacting the outpatient office including this writer if any acute adverse effects of their medication regimen arise in addition to any comments or concerns pertaining to their psychiatric management. Plan:  Optimize Zoloft to 175 mg daily for anxiety, depression, and OCD symptoms  Psychotherapy-declines at this time 2 months  Follow up with primary care provider for ongoing medical care  Follow up with this provider in     Diagnoses and all orders for this visit:    Mixed obsessional thoughts and acts  -     sertraline (Zoloft) 50 mg tablet; Take 1.5 tablets (75 mg total) by mouth daily In addition to 100 mg tablet for a total of 175 mg daily. -     sertraline (ZOLOFT) 100 mg tablet; Take 1 tablet (100 mg total) by mouth daily Take in addition to 75 mg for a total daily dose of 175 mg    Generalized anxiety disorder  -     sertraline (Zoloft) 50 mg tablet; Take 1.5 tablets (75 mg total) by mouth daily In addition to 100 mg tablet for a total of 175 mg daily. -     sertraline (ZOLOFT) 100 mg tablet; Take 1 tablet (100 mg total) by mouth daily Take in addition to 75 mg for a total daily dose of 175 mg    Major depressive disorder, recurrent, mild (HCC)  -     sertraline (Zoloft) 50 mg tablet; Take 1.5 tablets (75 mg total) by mouth daily In addition to 100 mg tablet for a total of 175 mg daily. -     sertraline (ZOLOFT) 100 mg tablet; Take 1 tablet (100 mg total) by mouth daily Take in addition to 75 mg for a total daily dose of 175 mg           - Psychoeducation provided regarding the importance of exercise and health dietary choices and their impact on mood, energy, and motivation.   - Counseled to avoid ETOH, illicit substances, and nicotine secondary to the detrimental effects of these substances on mental and physical health. - Encouraged to engage in non-verbal forms of therapy such as art therapy, music therapy, and mindfulness. Aware of 24 hour and weekend coverage for urgent situations accessed by calling [facility] main practice number    Medications Risks/Benefits      Risks, Benefits And Possible Side Effects Of Medications:    Risks, benefits, and possible side effects of medications explained to [facility] including risk of suicidality and serotonin syndrome related to treatment with antidepressants and risks and benefits of treatment with medications in pregnancy. She verbalizes understanding and agreement for treatment. Controlled Medication Discussion:     Not applicable    Psychotherapy Provided:     Individual psychotherapy provided: No  Goals discussed during session  Reassurance and supportive therapy provided  Crisis/safety plan discussed with Sally Mcdonough     Treatment Plan:    Completed and signed during the session: Not applicable - Treatment Plan not due at this session    Visit Time    Visit Start Time: 9:33 AM  Visit Stop Time: 9:47 AM  Total Visit Duration:  15 minutes    FRANCIS Grey 10/25/23    This note was completed in part utilizing [address] [place] [address]. Grammatical, translation, syntax errors, random word insertions, spelling mistakes, and incomplete sentences may be an occasional consequence of this system secondary to software limitations with voice recognition, ambient noise, and hardware issues. If you have any questions or concerns about the content, text, or information contained within the body of this dictation, please contact the provider for clarification.

## 2023-10-25 ENCOUNTER — TELEMEDICINE (OUTPATIENT)
Dept: PSYCHIATRY | Facility: CLINIC | Age: 23
End: 2023-10-25
Payer: COMMERCIAL

## 2023-10-25 DIAGNOSIS — F41.1 GENERALIZED ANXIETY DISORDER: ICD-10-CM

## 2023-10-25 DIAGNOSIS — F33.0 MAJOR DEPRESSIVE DISORDER, RECURRENT, MILD (HCC): ICD-10-CM

## 2023-10-25 DIAGNOSIS — F42.2 MIXED OBSESSIONAL THOUGHTS AND ACTS: Primary | ICD-10-CM

## 2023-10-25 PROCEDURE — 99214 OFFICE O/P EST MOD 30 MIN: CPT | Performed by: NURSE PRACTITIONER

## 2023-10-25 RX ORDER — SERTRALINE HYDROCHLORIDE 100 MG/1
100 TABLET, FILM COATED ORAL DAILY
Qty: 90 TABLET | Refills: 1 | Status: SHIPPED | OUTPATIENT
Start: 2023-10-25
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
## 2024-01-10 ENCOUNTER — TELEMEDICINE (OUTPATIENT)
Dept: PSYCHIATRY | Facility: CLINIC | Age: 24
End: 2024-01-10
Payer: COMMERCIAL

## 2024-01-10 DIAGNOSIS — F42.2 MIXED OBSESSIONAL THOUGHTS AND ACTS: Primary | ICD-10-CM

## 2024-01-10 DIAGNOSIS — F41.1 GENERALIZED ANXIETY DISORDER: ICD-10-CM

## 2024-01-10 DIAGNOSIS — F33.0 MAJOR DEPRESSIVE DISORDER, RECURRENT, MILD (HCC): ICD-10-CM

## 2024-01-10 PROCEDURE — 99213 OFFICE O/P EST LOW 20 MIN: CPT | Performed by: NURSE PRACTITIONER

## 2024-01-10 RX ORDER — NORGESTIMATE AND ETHINYL ESTRADIOL
KIT
COMMUNITY
Start: 2024-01-10

## 2024-01-10 NOTE — BH TREATMENT PLAN
TREATMENT PLAN (Medication Management Only)        [facility]    Name and Date of Birth:  Christie Whelan 23 y.o. 2000  Date of Treatment Plan: January 10, 2024  Diagnosis/Diagnoses:    1. Mixed obsessional thoughts and acts    2. Major depressive disorder, recurrent, mild (HCC)    3. Generalized anxiety disorder      Strengths/Personal Resources for Self-Care: supportive family, supportive friends, taking medications as prescribed, ability to adapt to life changes, ability to communicate needs, ability to communicate well, ability to listen, ability to reason, ability to understand psychiatric illness, average or above intelligence, family ties, financial means, financial security, general fund of knowledge, good physical health, good understanding of illness, independence, motivation for treatment, ability to negotiate basic needs, being resoureceful, self-reliance, sense of humor, special hobby/interest, stable employment, well educated, willingness to work on problems, work skills.  Area/Areas of need (in own words): anxiety symptoms  1. Long Term Goal: Feel calmer.  Target Date:6 months - 7/10/2024  Person/Persons responsible for completion of goal: Christie  2.  Short Term Objective (s) - How will we reach this goal?:   A. Provider new recommended medication/dosage changes and/or continue medication(s): continue current medications as prescribed.  B. Exercise regularly .  C. Try yoga.  Target Date:6 months - 7/10/2024  Person/Persons Responsible for Completion of Goal: Christie  Progress Towards Goals: stable  Treatment Modality: medication management every 4 months  Review due 180 days from date of this plan: 6 months - 7/10/2024  Expected length of service: maintenance  My Physician/PA/NP and I have developed this plan together and I agree to work on the goals and objectives. I understand the treatment goals that were developed for my treatment.

## 2024-05-06 ENCOUNTER — TELEMEDICINE (OUTPATIENT)
Dept: PSYCHIATRY | Facility: CLINIC | Age: 24
End: 2024-05-06
Payer: COMMERCIAL

## 2024-05-06 DIAGNOSIS — F33.41 MDD (MAJOR DEPRESSIVE DISORDER), RECURRENT, IN PARTIAL REMISSION (HCC): ICD-10-CM

## 2024-05-06 DIAGNOSIS — F33.0 MAJOR DEPRESSIVE DISORDER, RECURRENT, MILD (HCC): ICD-10-CM

## 2024-05-06 DIAGNOSIS — F42.2 MIXED OBSESSIONAL THOUGHTS AND ACTS: Primary | ICD-10-CM

## 2024-05-06 DIAGNOSIS — F41.1 GENERALIZED ANXIETY DISORDER: ICD-10-CM

## 2024-05-06 DIAGNOSIS — F42.2 MIXED OBSESSIONAL THOUGHTS AND ACTS: ICD-10-CM

## 2024-05-06 PROCEDURE — 99213 OFFICE O/P EST LOW 20 MIN: CPT | Performed by: NURSE PRACTITIONER

## 2024-05-06 RX ORDER — SERTRALINE HYDROCHLORIDE 100 MG/1
100 TABLET, FILM COATED ORAL DAILY
Qty: 90 TABLET | Refills: 1 | Status: SHIPPED | OUTPATIENT
Start: 2024-05-06

## 2024-05-06 NOTE — PSYCH
Virtual Visit Disclaimer:       TeleMed provider: MARY Sotelo.   Location: at [facility], [address] [place], [place], [address]    Verification of patient location:     Patient is located at Home in the state of [place].  Patient is currently located in a state in which I am licensed     After connecting through [employer], the patient was identified by name and date of birth.  Christie Whelan was informed that this is a telemedicine visit that is being conducted through [employer], and the patient was informed that this is a secure, HIPAA-compliant platform. My office door was closed. No one else was in the room.. Christie Whelan acknowledged consent and understanding of privacy and security of the video platform. Christie understands that the online visit is based solely on information provided by the patient, and that, in the absence of a face-to-face physical evaluation by the provider, the diagnosis Christie  receives is both limited and provisional in terms of accuracy and completeness. Christieailyn Whelan understands that they can discontinue the visit at any time. I informed Christie that I have reviewed their record in EPIC and presented the opportunity for them to ask any questions regarding the visit today. Christie Whelan voiced understanding and consented to these terms. Christie is aware this is a billable service.    Virtual visit start and stop times:    Start Time: 1036     Stop Time: 1048    I spent 11 minutes with patient today in which greater than 50% of the time was spent in counseling/coordination of care.      FRANCIS Sotelo 05/06/24    MEDICATION MANAGEMENT NOTE        [facility]      Name and Date of Birth:  Christie Whelan 24 y.o. 2000 MRN: [id number]    Date of Visit: May 6, 2024    Reason for Visit:   Chief Complaint   Patient presents with    Follow-up    Medication Management    Anxiety    Depression    OCD          SUBJECTIVE:    Christie Whelan is a 24 y.o. female with past psychiatric history significant for Major Depressive Disorder, Generalized Anxiety Disorder, OCD, and R/O Body Dysmorphia  who was virtually seen and evaluated today at the [facility] outpatient clinic for follow-up and medication management. Completes psychiatric assessment without difficulty.     Christie endorses compliance with psychotropic medication regimen that consists of Zoloft.  At previous outpatient psychiatric appointment with this writer, no medication changes were made.   She denies any current adverse medication side effects.      Overall, Christie reports that she is doing well.  She is finished with work and has a month off to relax.  She has catching up on sleep, crafting, and spending time outside.  Sleep, appetite, energy all good.  She is not depressed.  Anxiety has not been problematic.  OCD symptoms have been alleviated.  Feels current medication dose is appropriate.          Current Rating Scores:     Current PHQ-9   PHQ-2/9 Depression Screening    Little interest or pleasure in doing things: 0 - not at all  Feeling down, depressed, or hopeless: 0 - not at all  Trouble falling or staying asleep, or sleeping too much: 0 - not at all  Feeling tired or having little energy: 0 - not at all  Poor appetite or overeatin - not at all  Feeling bad about yourself - or that you are a failure or have let yourself or your family down: 0 - not at all  Trouble concentrating on things, such as reading the newspaper or watching television: 0 - not at all  Moving or speaking so slowly that other people could have noticed. Or the opposite - being so fidgety or restless that you have been moving around a lot more than usual: 0 - not at all  Thoughts that you would be better off dead, or of hurting yourself in some way: 0 - not at all  PHQ-9 Score: 0  PHQ-9 Interpretation: No or Minimal depression       Current J CARLOS-7 is    J CARLOS-7 Flowsheet Screening      Flowsheet Row Most Recent Value   Over the last 2 weeks, how often have you been bothered by any of the following problems?    Feeling nervous, anxious, or on edge 1   Not being able to stop or control worrying 1   Worrying too much about different things 1   Trouble relaxing 0   Being so restless that it is hard to sit still 0   Becoming easily annoyed or irritable 0   Feeling afraid as if something awful might happen 0   J CARLOS-7 Total Score 3        .    Past Psychiatric History: (unchanged information from previous note copied and italicized) - Information that is bolded has been updated.      Past Inpatient Psychiatric Treatment:   In Patient: denied   Past Outpatient Psychiatric Treatment:    individual therapy to address symptoms. She sees Bernice Valenzuela  Past Suicide Attempts:               no  Past Violent Behavior:               yes, she has hit her wrist with her other hand when she feels she needs a punishment or feels guilty.  Past Psychiatric Medication Trials:               Zoloft     Substance Abuse History: (unchanged information from previous note copied and italicized) - Information that is bolded has been updated.      Denied smoking cigarettes.  Eat Delta 8 (cannabis) gummies (weekends), drinks once monthly 4 drinks/night.     No past legal actions or arrests secondary to substance intoxication. The patient denies prior DWIs/DUIs. No history of outpatient/inpatient rehabilitation programs. Christie does not exhibit objective evidence of substance withdrawal during today's examination nor does Christie appear under the influence of any psychoactive substance.       Social History: (unchanged information from previous note copied and italicized) - Information that is bolded has been updated.      Born and raised: [place]. Parents . Has an older brother and sister.   Education: student final year of college. she has anothe year to do for a grad progam. she  attends [employer]  Learning Disabilities: none  Marital history: single, no children  Living arrangement, social support: The patient lives in a dorm alone.   Support systems: sister, parents as well, but limited. cousins and friends   Family relationship issues: boundry issues with parents. .   Family financial problems: denied.   Things the patient would change about the family include: wishes she had better boundaries with her mother and not so heavily reliant on her mother for support of her mental health.  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  Other Pertinent History: None      Traumatic History: (unchanged information from previous note copied and italicized) - Information that is bolded has been updated.      Abuse: emotional: mom  Other Traumatic Events: bullied in school      Past Medical History:    Past Medical History:   Diagnosis Date    Anxiety     Depression     Fibrocystic breast     Obsessive-compulsive disorder     Panic attack         Past Surgical History:   Procedure Laterality Date    EYE SURGERY      MOUTH SURGERY       Allergies   Allergen Reactions    Amoxicillin Hives    Cefprozil Hives    Ceftibuten     Diphenhydramine Hives    Penicillins        Substance Abuse History:    Social History     Substance and Sexual Activity   Alcohol Use Yes    Alcohol/week: 1.0 standard drink of alcohol    Types: 1 Standard drinks or equivalent per week    Comment: occ     Social History     Substance and Sexual Activity   Drug Use Yes    Types: Marijuana    Comment: occasional use       Social History:    Social History     Socioeconomic History    Marital status: Single     Spouse name: Not on file    Number of children: 0    Years of education: 16    Highest education level: Bachelor's degree (e.g., BA, AB, BS)   Occupational History     Occupation: student     Comment: [employer]   Tobacco Use    Smoking status: Light Smoker    Smokeless tobacco: Never   Vaping Use    Vaping status: Never Used   Substance and Sexual Activity    Alcohol use: Yes     Alcohol/week: 1.0 standard drink of alcohol     Types: 1 Standard drinks or equivalent per week     Comment: occ    Drug use: Yes     Types: Marijuana     Comment: occasional use    Sexual activity: Yes     Partners: Male     Birth control/protection: OCP     Comment: uses birth control   Other Topics Concern    Not on file   Social History Narrative    No caffeine use     Social Determinants of Health     Financial Resource Strain: Low Risk  (3/23/2021)    Overall Financial Resource Strain (CARDIA)     Difficulty of Paying Living Expenses: Not hard at all   Food Insecurity: No Food Insecurity (3/23/2021)    Hunger Vital Sign     Worried About Running Out of Food in the Last Year: Never true     Ran Out of Food in the Last Year: Never true   Transportation Needs: No Transportation Needs (3/23/2021)    PRAPARE - Transportation     Lack of Transportation (Medical): No     Lack of Transportation (Non-Medical): No   Physical Activity: Insufficiently Active (3/23/2021)    Exercise Vital Sign     Days of Exercise per Week: 2 days     Minutes of Exercise per Session: 60 min   Stress: Stress Concern Present (3/23/2021)    [employer] - Occupational Stress Questionnaire     Feeling of Stress : Very much   Social Connections: Socially Isolated (3/23/2021)    Social Connection and Isolation Panel [NHANES]     Frequency of Communication with Friends and Family: More than three times a week     Frequency of Social Gatherings with Friends and Family: More than three times a week     Attends Yazidism Services: Never     Active Member of Clubs or Organizations: No     Attends Club or Organization Meetings: Never     Marital Status: Never    Intimate Partner Violence: Not At Risk  (3/23/2021)    Humiliation, Afraid, Rape, and Kick questionnaire     Fear of Current or Ex-Partner: No     Emotionally Abused: No     Physically Abused: No     Sexually Abused: No   Housing Stability: Not on file       Family Psychiatric History:     Family History   Problem Relation Age of Onset    Lung disease Mother     Anxiety disorder Mother     Rashes / Skin problems Mother     Hyperlipidemia Father     Heart disease Father     Osteoarthritis Father     Rashes / Skin problems Father     Personality disorder Sister     Alcohol abuse Sister     Anxiety disorder Brother     Depression Brother     Heart attack Maternal Grandmother     Osteoarthritis Maternal Grandmother     Rashes / Skin problems Maternal Grandmother         Rosacea    Heart attack Maternal Grandfather     Heart disease Maternal Grandfather     Osteoarthritis Paternal Grandmother     Rashes / Skin problems Paternal Grandmother     Cancer Paternal Grandfather     Heart disease Paternal Grandfather     Bipolar disorder Maternal Aunt     Alcohol abuse Cousin     Suicide Attempts Cousin     Migraines Brother     Rashes / Skin problems Brother         Pressure hives       History Review: The following portions of the patient's history were reviewed and updated as appropriate: allergies, current medications, and past social history.         OBJECTIVE:     Vital signs in last 24 hours:    There were no vitals filed for this visit.    Mental Status Evaluation:    Appearance age appropriate, casually dressed   Behavior cooperative, calm   Speech normal rate, normal volume, normal pitch   Mood minimally anxious   Affect normal range and intensity, appropriate   Thought Processes organized, goal directed   Associations intact associations   Thought Content no overt delusions   Perceptual Disturbances: no auditory hallucinations, no visual hallucinations   Abnormal Thoughts  Risk Potential Suicidal ideation - None  Homicidal ideation - None  Potential for  "aggression - No   Orientation oriented to: person, place, time/date, and situation   Memory recent and remote memory grossly intact   Consciousness alert and awake   Attention Span Concentration Span attention span and concentration are age appropriate   Intellect appears to be of average intelligence   Insight intact   Judgement intact   Muscle Strength and  Gait unable to assess today due to virtual visit   Motor activity unable to assess today due to virtual visit   Language no difficulty naming common objects, no difficulty repeating a phrase   Fund of Knowledge adequate knowledge of current events  adequate fund of knowledge regarding past history  adequate fund of knowledge regarding vocabulary    Pain none   Pain Scale 0       Laboratory Results: I have personally reviewed all pertinent laboratory/tests results    Lethality Statement:    Based on today's assessment and clinical criteria, Christie Whelan contracts for safety and is not an imminent risk of harm to self or others. Outpatient level of care is deemed appropriate at this current time. Christie understands that if they can no longer contract for safety, they need to call the office or report to their nearest Emergency Room for immediate evaluation.    Assessment/Plan:     In summary, Christie Whelan is a 22 y.o.  female, Single (never ), domiciled with boyfriend and father, currently employed, w/no significant PMH and PPH of MDD, J CARLOS, OCD, no prior psychiatric admissions, no prior SA, positive h/o self-injurious behavior (hit self),  who presented to the mental health clinic for the initial intake and psychiatric evaluation on May 25, 2022.  Christie was a former patient of Dr. Jackman (last visit on 8/27/21) on Zoloft 100mg QD. Christie Whelan was visited in the clinic; chart reviewed. Presented restless, fidgety, cooperative and well related, casually dressed w/ good hygiene, good eye contact, wearing face mask, \"good\"mood, pleasant " "affect, talking in normal tone, volume and amount, w/ linear thought process, fair insight and judgement. Reports first meeting with psychiatrist at age 21 due to heightened symptoms and emotional breakdowns at home.  Precipitating stressors included pandemic, aunt's death, and social isolation.  However, states that symptoms of anxiety/depression/ocd started at age 9.  Would tap on a tooth brush, click light switches, and check door knobs.  Intrusive thoughts came later and presented with negative feelings towards self and would punish self with hitting herself in the wrist/arm with other hand.   Was also bullied in school secondary to shyness and struggles to make friends.  Mother became her only friend at that time, and therefore boundaries were difficult to maintain.  Would rely on mother with OCD and reassurance.  Mother continues to worry at times, but both are respectful of boundaries. Was initiated on Zoloft by Dr. Jackman and titrated to 100 mg daily.  Attempted to increase to 50 mg, but felt more anxious at that dose and decreased back to 100 mg QD (current dose).  Is now able to process feelings without needing as much reassurance.  Still working on not hating self as much.  No self harm with last episode occurring in December 2021.  Took one week off of Zoloft due to lack of medication recently and symptoms resumed.  Crying, ballesteros, anxious, lashing out, irritable.  Symptoms dissipated once medications were resumed.  Continues to struggle with intrusive thoughts, mostly.  At times attributes thoughts to boyfriend.  Thoughts mostly have to do with \"being a bad person, saying racial slurs\".  Her current presentation meets criteria for MDD, J CARLOS, OCD R/O body dysmorphia. Zoloft optimized to 175 mg daily.     Past PHQ-9 scores: 9,7,6,5  Past J CARLOS-7 scores: 17,7,8,7    Current PHQ-9 score: 0  Current J CARLOS-7 score: 3    Psychopharmacologically, Christie endorses mood stability on current regimen.  Anxiety and " OCD symptoms are not problematic.  No medication changes.     Risks/benefits/alternativies to treatment discussed, including a myriad of potential adverse medication side effects, to which Christie voiced understanding and consented fully to treatment.  Also, patient is amenable to calling/contacting the outpatient office including this writer if any acute adverse effects of their medication regimen arise in addition to any comments or concerns pertaining to their psychiatric management.         Plan:  Continue Zoloft 175 mg daily for OCD, depression, and anxiety  Psychotherapy-declines at this time  Follow up with primary care provider for ongoing medical care  Follow up with this provider in 6 months     Diagnoses and all orders for this visit:    Mixed obsessional thoughts and acts  -     sertraline (ZOLOFT) 100 mg tablet; Take 1 tablet (100 mg total) by mouth daily Take in addition to 75 mg for a total daily dose of 175 mg  -     sertraline (ZOLOFT) 50 mg tablet; Take 1.5 tablets (75 mg total) by mouth daily Take with 100 mg tablet for a total daily dose of 175 mg    Generalized anxiety disorder  -     sertraline (ZOLOFT) 100 mg tablet; Take 1 tablet (100 mg total) by mouth daily Take in addition to 75 mg for a total daily dose of 175 mg  -     sertraline (ZOLOFT) 50 mg tablet; Take 1.5 tablets (75 mg total) by mouth daily Take with 100 mg tablet for a total daily dose of 175 mg    MDD (major depressive disorder), recurrent, in partial remission (HCC)  -     sertraline (ZOLOFT) 100 mg tablet; Take 1 tablet (100 mg total) by mouth daily Take in addition to 75 mg for a total daily dose of 175 mg  -     sertraline (ZOLOFT) 50 mg tablet; Take 1.5 tablets (75 mg total) by mouth daily Take with 100 mg tablet for a total daily dose of 175 mg           - Psychoeducation provided regarding the importance of exercise and health dietary choices and their impact on mood, energy, and motivation.  - Counseled to avoid ETOH,  illicit substances, and nicotine secondary to the detrimental effects of these substances on mental and physical health.  - Encouraged to engage in non-verbal forms of therapy such as art therapy, music therapy, and mindfulness.   Aware of 24 hour and weekend coverage for urgent situations accessed by calling [facility] main practice number    Medications Risks/Benefits      Risks, Benefits And Possible Side Effects Of Medications:    Risks, benefits, and possible side effects of medications explained to Christie including risk of suicidality and serotonin syndrome related to treatment with antidepressants. She verbalizes understanding and agreement for treatment.    Controlled Medication Discussion:     Not applicable    Psychotherapy Provided:     Individual psychotherapy provided: No  Reassurance and supportive therapy provided     Treatment Plan:    Completed and signed during the session: Not applicable - Treatment Plan not due at this session    Visit Time    Visit Start Time: 10:36 AM  Visit Stop Time: 10:48 AM  Total Visit Duration:  11 minutes    FRANCIS Sotelo 05/06/24    This note was completed in part utilizing [employer]. Grammatical, translation, syntax errors, random word insertions, spelling mistakes, and incomplete sentences may be an occasional consequence of this system secondary to software limitations with voice recognition, ambient noise, and hardware issues. If you have any questions or concerns about the content, text, or information contained within the body of this dictation, please contact the provider for clarification.

## 2024-06-10 ENCOUNTER — TELEPHONE (OUTPATIENT)
Age: 24
End: 2024-06-10

## 2024-06-10 NOTE — TELEPHONE ENCOUNTER
Patient had to cancel appointment she had coming up due to a death in the family. There are no available appointments till January and patient needs her birth control refilled. Please contact patient regarding medication refill.

## 2024-06-11 DIAGNOSIS — Z30.41 ENCOUNTER FOR BIRTH CONTROL PILLS MAINTENANCE: Primary | ICD-10-CM

## 2024-06-11 RX ORDER — NORGESTIMATE AND ETHINYL ESTRADIOL
1 KIT DAILY
Qty: 84 TABLET | Refills: 1 | Status: SHIPPED | OUTPATIENT
Start: 2024-06-11 | End: 2024-06-17

## 2024-06-14 DIAGNOSIS — Z30.41 ENCOUNTER FOR BIRTH CONTROL PILLS MAINTENANCE: ICD-10-CM

## 2024-06-17 RX ORDER — NORGESTIMATE AND ETHINYL ESTRADIOL
1 KIT DAILY
Qty: 84 TABLET | Refills: 1 | Status: SHIPPED | OUTPATIENT
Start: 2024-06-17

## 2024-09-18 DIAGNOSIS — Z30.41 ENCOUNTER FOR BIRTH CONTROL PILLS MAINTENANCE: ICD-10-CM

## 2024-09-18 RX ORDER — NORGESTIMATE AND ETHINYL ESTRADIOL
1 KIT DAILY
Qty: 84 TABLET | Refills: 1 | OUTPATIENT
Start: 2024-09-18

## 2024-09-18 NOTE — TELEPHONE ENCOUNTER
Reason for call:   [x] Refill   [] Prior Auth  [] Other:     Office:   [] PCP/Provider -   [x] Specialty/Provider - [facility]     Medication:  Tri-Lo-Sprintec 0.18/0.215/0.25 MG-25 MCG per tablet    Dose/Frequency: : Take 1 tablet by mouth daily     Quantity: 84    Pharmacy: [facility] - [place], [place] - [facility] [address]      Does the patient have enough for 3 days?   [x] Yes   [] No - Send as HP to POD

## 2024-09-20 RX ORDER — NORGESTIMATE AND ETHINYL ESTRADIOL 7DAYSX3 LO
1 KIT ORAL DAILY
Qty: 84 TABLET | Refills: 1 | Status: SHIPPED | OUTPATIENT
Start: 2024-09-20 | End: 2024-09-25

## 2024-09-25 DIAGNOSIS — Z30.41 ENCOUNTER FOR BIRTH CONTROL PILLS MAINTENANCE: ICD-10-CM

## 2024-09-25 RX ORDER — NORGESTIMATE AND ETHINYL ESTRADIOL 7DAYSX3 LO
1 KIT ORAL DAILY
Qty: 84 TABLET | Refills: 0 | Status: SHIPPED | OUTPATIENT
Start: 2024-09-25

## 2024-09-25 NOTE — TELEPHONE ENCOUNTER
Not a duplicate. Sent to wrong pharmacy.    Reason for call:   [x] Refill   [] Prior Auth  [] Other:     Office:   [] PCP/Provider -   [x] Specialty/Provider - Gyno    Medication: norgestimate-ethinyl estradiol (Tri-Lo-Avril) 0.18/0.215/0.25 MG-25 MCG per tablet     Dose/Frequency: Take 1 tablet by mouth daily     Quantity: 84    Pharmacy: [facility] - [place], [place] - [address]    Does the patient have enough for 3 days?   [] Yes   [x] No - Send as HP to POD

## 2024-09-26 RX ORDER — NORGESTIMATE AND ETHINYL ESTRADIOL 7DAYSX3 LO
1 KIT ORAL DAILY
Qty: 84 TABLET | Refills: 0 | OUTPATIENT
Start: 2024-09-26

## 2024-11-06 ENCOUNTER — TELEPHONE (OUTPATIENT)
Dept: PSYCHIATRY | Facility: CLINIC | Age: 24
End: 2024-11-06

## 2024-11-06 NOTE — TELEPHONE ENCOUNTER
LVM for PT to call office to R/S todays virtual appt. Appt was made during providers charting time and provider was not able to connect. Please R/S virtual appt as soon as the PT would like

## 2024-11-13 DIAGNOSIS — F41.1 GENERALIZED ANXIETY DISORDER: ICD-10-CM

## 2024-11-13 DIAGNOSIS — F33.41 MDD (MAJOR DEPRESSIVE DISORDER), RECURRENT, IN PARTIAL REMISSION (HCC): ICD-10-CM

## 2024-11-13 DIAGNOSIS — F42.2 MIXED OBSESSIONAL THOUGHTS AND ACTS: ICD-10-CM

## 2024-11-13 NOTE — TELEPHONE ENCOUNTER
Called and left message for patient to return a call to [phone] and schedule a follow up with provider. Please schedule upon return call. Thank you.

## 2024-11-13 NOTE — TELEPHONE ENCOUNTER
Called Crhistie and confirmed that she has been taking this. She requested to reschedule her appointment that was recently cancelled. Forwarding to provider and clerical staff for review.

## 2024-11-14 RX ORDER — SERTRALINE HYDROCHLORIDE 100 MG/1
TABLET, FILM COATED ORAL
Qty: 90 TABLET | Refills: 0 | Status: SHIPPED | OUTPATIENT
Start: 2024-11-14 | End: 2024-11-21 | Stop reason: SDUPTHER

## 2024-11-21 ENCOUNTER — TELEMEDICINE (OUTPATIENT)
Dept: PSYCHIATRY | Facility: CLINIC | Age: 24
End: 2024-11-21
Payer: COMMERCIAL

## 2024-11-21 DIAGNOSIS — F42.2 MIXED OBSESSIONAL THOUGHTS AND ACTS: Primary | ICD-10-CM

## 2024-11-21 DIAGNOSIS — F41.1 GENERALIZED ANXIETY DISORDER: ICD-10-CM

## 2024-11-21 DIAGNOSIS — F33.41 MDD (MAJOR DEPRESSIVE DISORDER), RECURRENT, IN PARTIAL REMISSION (HCC): ICD-10-CM

## 2024-11-21 PROCEDURE — 99214 OFFICE O/P EST MOD 30 MIN: CPT | Performed by: NURSE PRACTITIONER

## 2024-11-21 RX ORDER — SERTRALINE HYDROCHLORIDE 100 MG/1
100 TABLET, FILM COATED ORAL
Qty: 90 TABLET | Refills: 3 | Status: SHIPPED | OUTPATIENT
Start: 2024-11-21

## 2024-11-21 NOTE — ASSESSMENT & PLAN NOTE
Status: At goal  Plan: Continue Zoloft 175 mg daily  Orders:    sertraline (ZOLOFT) 100 mg tablet; Take 1 tablet (100 mg total) by mouth daily at bedtime Take with 1.5- 50 mg tablets for a total daily dose of 175 mg    sertraline (ZOLOFT) 50 mg tablet; Take 1.5 tablets (75 mg total) by mouth daily Take with 100 mg tablet for a total daily dose of 175 mg
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
